# Patient Record
Sex: FEMALE | Race: WHITE | NOT HISPANIC OR LATINO | Employment: FULL TIME | URBAN - METROPOLITAN AREA
[De-identification: names, ages, dates, MRNs, and addresses within clinical notes are randomized per-mention and may not be internally consistent; named-entity substitution may affect disease eponyms.]

---

## 2017-01-03 ENCOUNTER — OFFICE VISIT (OUTPATIENT)
Dept: FAMILY MEDICINE CLINIC | Facility: CLINIC | Age: 42
End: 2017-01-03

## 2017-01-03 VITALS
OXYGEN SATURATION: 94 % | SYSTOLIC BLOOD PRESSURE: 116 MMHG | HEART RATE: 94 BPM | HEIGHT: 68 IN | BODY MASS INDEX: 33.04 KG/M2 | DIASTOLIC BLOOD PRESSURE: 76 MMHG | WEIGHT: 218 LBS

## 2017-01-03 DIAGNOSIS — F32.A DEPRESSION, UNSPECIFIED DEPRESSION TYPE: Primary | ICD-10-CM

## 2017-01-03 PROCEDURE — 99213 OFFICE O/P EST LOW 20 MIN: CPT | Performed by: NURSE PRACTITIONER

## 2017-01-03 RX ORDER — MULTIVIT WITH MINERALS/LUTEIN
250 TABLET ORAL DAILY
COMMUNITY

## 2017-01-03 RX ORDER — FLUOXETINE HYDROCHLORIDE 20 MG/1
20 CAPSULE ORAL DAILY
Qty: 30 CAPSULE | Refills: 0 | Status: SHIPPED | OUTPATIENT
Start: 2017-01-03 | End: 2017-02-06

## 2017-01-03 NOTE — MR AVS SNAPSHOT
Merari GLORIA Bowen   1/3/2017 11:30 AM   Office Visit    Provider:  RANDOLPH Lofton   Department:  [facility]   Dept Phone:  [phone]                Your Full Care Plan              Today's Medication Changes          These changes are accurate as of: 1/3/17  1:00 PM.  If you have any questions, ask your nurse or doctor.               Medication(s)that have changed:     FLUoxetine 20 MG capsule   Commonly known as:  PROzac   Take 1 capsule by mouth Daily.   What changed:    - medication strength  - how much to take   Changed by:  RANDOLPH Lofton         Stop taking medication(s)listed here:     azithromycin 250 MG tablet   Commonly known as:  ZITHROMAX   Stopped by:  RANDOLPH Lofton                Where to Get Your Medications      These medications were sent to [id number] - [facility] [address] [phone]  - [phone]   [address] [address]     Phone:  [phone]     FLUoxetine 20 MG capsule                  Your Updated Medication List          This list is accurate as of: 1/3/17  1:00 PM.  Always use your most recent med list.                cetirizine 10 MG tablet   Commonly known as:  zyrTEC       FLUoxetine 20 MG capsule   Commonly known as:  PROzac   Take 1 capsule by mouth Daily.       fluticasone 50 MCG/ACT nasal spray   Commonly known as:  FLONASE       omeprazole 40 MG capsule   Commonly known as:  priLOSEC   TAKE 1 CAPSULE BY MOUTH DAILY       PROAIR RESPICLICK IN       SUMAtriptan 100 MG tablet   Commonly known as:  IMITREX   Take one tablet at onset of headache. May repeat dose one time in 2 hours if headache not relieved.       vitamin C 250 MG tablet   Commonly known as:  ASCORBIC ACID               You Were Diagnosed With        Codes Comments    Depression, unspecified depression type    -  Primary ICD-10-CM: F32.9  ICD-9-CM: 311       Instructions     "None    Patient Instructions History      MyChart Signup     Our records indicate that you have declined [facility] signup. If you would like to sign up for [employer], please email [facility] [email or web address] [email or web address] or call [phone] to obtain an activation code.             Other Info from Your Visit           Allergies     Chlorcyclizine-pseudoephed      Cimetidine      Diclofenac        Reason for Visit     Depression           Vital Signs     Blood Pressure Pulse Height Weight Oxygen Saturation Body Mass Index    116/76 94 68\" (172.7 cm) 218 lb (98.9 kg) 94% 33.15 kg/m2    Smoking Status                   Current Some Day Smoker           Problems and Diagnoses Noted     Depression      "

## 2017-01-03 NOTE — PROGRESS NOTES
Subjective   Merari Bowen is a 42 y.o. female. Here today for follow up on her depression. She feels it is worsening. She wants to sleep all the time and cries easily.  She has been on depression medication for several years and at one time was on 40 mg. She has been taking 10 mg for at least the last 18 months. The depression started getting worse over the last few months and really worse on the holidays. She was originally put on Prozac by Dr. Jaramillo and states that it has worked pretty well and she is upset that he has passed away. She is unhappy about her job feeling overwhelmed with the amount of work required. She does not have the option of changing jobs. She has had some thoughts of suicide but has not made any plans nor does she feel she will that's why she came in today. Her  is accompanying her today and is supportive. She has not had formal counseling and does not really want to do that at this time.     Depression   Visit Type: follow-up  Patient presents with the following symptoms: depressed mood, hypersomnia, nervousness/anxiety and suicidal ideas (no plans but fleeting thoughts).  Patient is not experiencing: excessive worry and feelings of hopelessness.  Frequency of symptoms: constantly   Severity: interfering with daily activities   Sleep per night: 13 hours  Sleep quality: poor  Nighttime awakenings: several         The following portions of the patient's history were reviewed and updated as appropriate: allergies, current medications, past medical history, past social history and problem list.    Review of Systems   Constitutional: Negative.    Respiratory: Negative.    Cardiovascular: Negative.    Psychiatric/Behavioral: Positive for dysphoric mood and suicidal ideas (no plans but fleeting thoughts). The patient is nervous/anxious.        Objective   Physical Exam   Constitutional: She appears well-developed and well-nourished.   Cardiovascular: Normal rate, regular rhythm and  normal heart sounds.    Pulmonary/Chest: Effort normal and breath sounds normal.   Nursing note and vitals reviewed.    Vitals:    01/03/17 1128   BP: 116/76   Pulse: 94   SpO2: 94%       Assessment/Plan   Merari was seen today for depression.    Diagnoses and all orders for this visit:    Depression, unspecified depression type  -     FLUoxetine (PROzac) 20 MG capsule; Take 1 capsule by mouth Daily.  Will increase the Prozac to 20 mg daily. She is to follow up by phone in about one week to see if this is helping at all. If not, will double to 40 mg. She is to follow up in 3 weeks for recheck. Consider changing medication at that time if not effective. She was encouraged to try formal counseling but she is reluctant to do so. Will provide numbers. Her  was advised to take her to mental health facility if he feels she is suicidal and would act. He understands and agrees.

## 2017-02-06 ENCOUNTER — OFFICE VISIT (OUTPATIENT)
Dept: FAMILY MEDICINE CLINIC | Facility: CLINIC | Age: 42
End: 2017-02-06

## 2017-02-06 VITALS
BODY MASS INDEX: 33.15 KG/M2 | OXYGEN SATURATION: 98 % | HEART RATE: 88 BPM | WEIGHT: 218 LBS | TEMPERATURE: 98.5 F | SYSTOLIC BLOOD PRESSURE: 140 MMHG | DIASTOLIC BLOOD PRESSURE: 86 MMHG

## 2017-02-06 DIAGNOSIS — H66.002 ACUTE SUPPURATIVE OTITIS MEDIA OF LEFT EAR WITHOUT SPONTANEOUS RUPTURE OF TYMPANIC MEMBRANE, RECURRENCE NOT SPECIFIED: ICD-10-CM

## 2017-02-06 DIAGNOSIS — E78.5 DYSLIPIDEMIA: ICD-10-CM

## 2017-02-06 DIAGNOSIS — F32.4 MAJOR DEPRESSIVE DISORDER WITH SINGLE EPISODE, IN PARTIAL REMISSION (HCC): Primary | ICD-10-CM

## 2017-02-06 DIAGNOSIS — R11.0 NAUSEA: ICD-10-CM

## 2017-02-06 LAB
ALBUMIN SERPL-MCNC: 4.1 G/DL (ref 3.5–5.2)
ALBUMIN/GLOB SERPL: 1.5 G/DL
ALP SERPL-CCNC: 69 U/L (ref 39–117)
ALT SERPL-CCNC: 25 U/L (ref 1–33)
AST SERPL-CCNC: 22 U/L (ref 1–32)
BILIRUB SERPL-MCNC: 0.4 MG/DL (ref 0.1–1.2)
BUN SERPL-MCNC: 11 MG/DL (ref 6–20)
BUN/CREAT SERPL: 14.1 (ref 7–25)
CALCIUM SERPL-MCNC: 9.2 MG/DL (ref 8.6–10.5)
CHLORIDE SERPL-SCNC: 104 MMOL/L (ref 98–107)
CHOLEST SERPL-MCNC: 259 MG/DL (ref 0–200)
CO2 SERPL-SCNC: 26.9 MMOL/L (ref 22–29)
CREAT SERPL-MCNC: 0.78 MG/DL (ref 0.57–1)
GLOBULIN SER CALC-MCNC: 2.8 GM/DL
GLUCOSE SERPL-MCNC: 96 MG/DL (ref 65–99)
HDLC SERPL-MCNC: 42 MG/DL (ref 40–60)
LDLC SERPL CALC-MCNC: 196 MG/DL (ref 0–100)
POTASSIUM SERPL-SCNC: 3.7 MMOL/L (ref 3.5–5.2)
PROT SERPL-MCNC: 6.9 G/DL (ref 6–8.5)
SODIUM SERPL-SCNC: 142 MMOL/L (ref 136–145)
TRIGL SERPL-MCNC: 107 MG/DL (ref 0–150)
VLDLC SERPL CALC-MCNC: 21.4 MG/DL (ref 5–40)

## 2017-02-06 PROCEDURE — 99213 OFFICE O/P EST LOW 20 MIN: CPT | Performed by: FAMILY MEDICINE

## 2017-02-06 RX ORDER — FLUOXETINE HYDROCHLORIDE 10 MG/1
10 CAPSULE ORAL NIGHTLY
Qty: 30 EACH | Refills: 5 | Status: SHIPPED | OUTPATIENT
Start: 2017-02-06 | End: 2017-03-20 | Stop reason: ALTCHOICE

## 2017-02-06 RX ORDER — VILAZODONE HYDROCHLORIDE 20 MG/1
20 TABLET ORAL DAILY
Qty: 30 TABLET | Refills: 5 | Status: SHIPPED | OUTPATIENT
Start: 2017-02-06 | End: 2017-03-20 | Stop reason: SDUPTHER

## 2017-02-06 RX ORDER — AMOXICILLIN 500 MG/1
500 CAPSULE ORAL 3 TIMES DAILY
Qty: 30 CAPSULE | Refills: 0 | Status: SHIPPED | OUTPATIENT
Start: 2017-02-06 | End: 2017-03-20

## 2017-02-06 RX ORDER — ONDANSETRON 4 MG/1
4 TABLET, FILM COATED ORAL EVERY 8 HOURS PRN
Qty: 24 TABLET | Refills: 0 | Status: SHIPPED | OUTPATIENT
Start: 2017-02-06 | End: 2017-03-20

## 2017-02-06 NOTE — PROGRESS NOTES
Subjective   Merari Bowen is a 42 y.o. female. Presents today for   Chief Complaint   Patient presents with   • Follow-up     med change of prozac not helping.  Having a hard time waking up on this and tired is there something she can split bid ?  Feeling nauseated and headache this weekend with 1 x diarrhea.       Depression   Visit Type: follow-up  Patient presents with the following symptoms: depressed mood, excessive worry, hypersomnia, nervousness/anxiety and panic.    Migraine    This is a chronic problem. The problem occurs intermittently. The problem has been waxing and waning. Associated symptoms include abdominal pain (epigastric) and nausea. Pertinent negatives include no vomiting. Treatments tried: imitrex. The treatment provided moderate (HA resolved, but nausea has not.) relief. Her past medical history is significant for migraine headaches.   Nausea   This is a new problem. The current episode started in the past 7 days. The problem occurs constantly. The problem has been unchanged. Associated symptoms include abdominal pain (epigastric) and nausea. Pertinent negatives include no vomiting. Associated symptoms comments: Diarrhea x1, but resolved. Nothing aggravates the symptoms. She has tried nothing (Tx migraine.) for the symptoms. The treatment provided no relief.   Hx of dyslipidemia, off statin as intolerant, due for recheck  Reports nasal congestion and earache, feels full/dizzy.    Review of Systems   Gastrointestinal: Positive for abdominal pain (epigastric) and nausea. Negative for vomiting.   Psychiatric/Behavioral: The patient is nervous/anxious.        The following portions of the patient's history were reviewed and updated as appropriate: allergies, current medications, past medical history and problem list.    Patient Active Problem List   Diagnosis   • Atopic dermatitis   • Calf pain   • Calf swelling   • Carpal tunnel syndrome   • Essential familial hypercholesterolemia   • Fatigue    • Leg cramp   • Cervical pain   • Lesion of ulnar nerve   • Vitamin D deficiency   • Depression       Allergies   Allergen Reactions   • Chlorcyclizine-Pseudoephed    • Cimetidine    • Diclofenac        Current Outpatient Prescriptions on File Prior to Visit   Medication Sig Dispense Refill   • Albuterol Sulfate (PROAIR RESPICLICK IN) Inhale every 6 (six) hours as needed.     • cetirizine (ZyrTEC) 10 MG tablet Take 10 mg by mouth Daily.     • FLUoxetine (PROzac) 20 MG capsule Take 1 capsule by mouth Daily. 30 capsule 0   • omeprazole (priLOSEC) 40 MG capsule TAKE 1 CAPSULE BY MOUTH DAILY 90 capsule 1   • SUMAtriptan (IMITREX) 100 MG tablet Take one tablet at onset of headache. May repeat dose one time in 2 hours if headache not relieved. 9 tablet 5   • vitamin C (ASCORBIC ACID) 250 MG tablet Take 250 mg by mouth Daily.     • [DISCONTINUED] fluticasone (FLONASE) 50 MCG/ACT nasal spray 2 SPRAYS EACH NOSTRIL DAILY  4     No current facility-administered medications on file prior to visit.        Objective   Vitals:    02/06/17 0905   BP: 140/86   Pulse: 88   Temp: 98.5 °F (36.9 °C)   SpO2: 98%   Weight: 218 lb (98.9 kg)       Physical Exam   Constitutional: She appears well-developed and well-nourished.   HENT:   Head: Normocephalic and atraumatic.   Right Ear: Hearing, tympanic membrane, external ear and ear canal normal.   Left Ear: Hearing, external ear and ear canal normal. Tympanic membrane is injected and erythematous. A middle ear effusion is present.   Neck: Neck supple. No JVD present. No thyromegaly present.   Cardiovascular: Normal rate, regular rhythm and normal heart sounds.  Exam reveals no gallop and no friction rub.    No murmur heard.  Pulmonary/Chest: Effort normal and breath sounds normal. No respiratory distress. She has no wheezes. She has no rales.   Abdominal: Soft. Bowel sounds are normal. She exhibits no distension. There is no tenderness. There is no rebound and no guarding.    Musculoskeletal: She exhibits no edema.   Neurological: She is alert.   Skin: Skin is warm and dry.   Psychiatric: She has a normal mood and affect. Her behavior is normal.   Nursing note and vitals reviewed.      Assessment/Plan   Merari was seen today for follow-up.    Diagnoses and all orders for this visit:    Major depressive disorder with single episode, in partial remission  -     FLUoxetine HCl, PMDD, 10 MG capsule; Take 10 mg by mouth Every Night.  -     vilazodone (VIIBRYD) 20 MG tablet tablet; Take 1 tablet by mouth Daily.    Dyslipidemia  -     Lipid Panel  -     Comprehensive Metabolic Panel    Acute suppurative otitis media of left ear without spontaneous rupture of tympanic membrane, recurrence not specified  -     amoxicillin (AMOXIL) 500 MG capsule; Take 1 capsule by mouth 3 (Three) Times a Day.    Nausea  -     ondansetron (ZOFRAN) 4 MG tablet; Take 1 tablet by mouth Every 8 (Eight) Hours As Needed for nausea or vomiting.    -will cut prozac to 10mg daily for 4 weeks and gave sample packs of viibyd to take 10mg daily x 14 days, then 20mg x 14 days;  After 4 weeks stop prozac, call if doesn't tolerate stopping.  Will then f/u with in 2 months to see how doing.         -Follow up: 2 months       Current Outpatient Prescriptions:   •  Albuterol Sulfate (PROAIR RESPICLICK IN), Inhale every 6 (six) hours as needed., Disp: , Rfl:   •  cetirizine (ZyrTEC) 10 MG tablet, Take 10 mg by mouth Daily., Disp: , Rfl:   •  FLUoxetine (PROzac) 20 MG capsule, Take 1 capsule by mouth Daily., Disp: 30 capsule, Rfl: 0  •  omeprazole (priLOSEC) 40 MG capsule, TAKE 1 CAPSULE BY MOUTH DAILY, Disp: 90 capsule, Rfl: 1  •  SUMAtriptan (IMITREX) 100 MG tablet, Take one tablet at onset of headache. May repeat dose one time in 2 hours if headache not relieved., Disp: 9 tablet, Rfl: 5  •  vitamin C (ASCORBIC ACID) 250 MG tablet, Take 250 mg by mouth Daily., Disp: , Rfl:

## 2017-02-08 NOTE — PROGRESS NOTES
Please call the patient regarding her abnormal result.  Patient's kidney, liver function and blood sugar are normal.  Her cholesterol is very high with LDL of 196.  While I do recommend low cholesterol diet and exercise always to make sure not worse, when above 175 likely genetic cause and unlikely to improve without medications.  If can tolerate, I would suggest atorvastatin again 40mg po daily (likely needs 80mg).

## 2017-02-13 ENCOUNTER — TELEPHONE (OUTPATIENT)
Dept: FAMILY MEDICINE CLINIC | Facility: CLINIC | Age: 42
End: 2017-02-13

## 2017-02-13 RX ORDER — ATORVASTATIN CALCIUM 40 MG/1
40 TABLET, FILM COATED ORAL DAILY
Qty: 90 TABLET | Refills: 1 | Status: SHIPPED | OUTPATIENT
Start: 2017-02-13 | End: 2017-03-20

## 2017-02-13 NOTE — TELEPHONE ENCOUNTER
----- Message from Chandan Rubio DO sent at 2/8/2017  7:54 AM EST -----  Please call the patient regarding her abnormal result.  Patient's kidney, liver function and blood sugar are normal.  Her cholesterol is very high with LDL of 196.  While I do recommend low cholesterol diet and exercise always to make sure not worse, when above 175 likely genetic cause and unlikely to improve without medications.  If can tolerate, I would suggest atorvastatin again 40mg po daily (likely needs 80mg).

## 2017-03-06 RX ORDER — FLUOXETINE 10 MG/1
CAPSULE ORAL
Qty: 90 CAPSULE | Refills: 3 | Status: SHIPPED | OUTPATIENT
Start: 2017-03-06 | End: 2017-03-20 | Stop reason: ALTCHOICE

## 2017-03-20 ENCOUNTER — OFFICE VISIT (OUTPATIENT)
Dept: FAMILY MEDICINE CLINIC | Facility: CLINIC | Age: 42
End: 2017-03-20

## 2017-03-20 VITALS
DIASTOLIC BLOOD PRESSURE: 80 MMHG | WEIGHT: 221 LBS | BODY MASS INDEX: 33.49 KG/M2 | SYSTOLIC BLOOD PRESSURE: 110 MMHG | HEIGHT: 68 IN | TEMPERATURE: 98.2 F | HEART RATE: 72 BPM | OXYGEN SATURATION: 97 %

## 2017-03-20 DIAGNOSIS — R39.9 UTI SYMPTOMS: Primary | ICD-10-CM

## 2017-03-20 DIAGNOSIS — E78.49 OTHER HYPERLIPIDEMIA: ICD-10-CM

## 2017-03-20 DIAGNOSIS — N39.0 URINARY TRACT INFECTION, SITE UNSPECIFIED: ICD-10-CM

## 2017-03-20 DIAGNOSIS — F32.4 MAJOR DEPRESSIVE DISORDER WITH SINGLE EPISODE, IN PARTIAL REMISSION (HCC): ICD-10-CM

## 2017-03-20 LAB
BILIRUB BLD-MCNC: NEGATIVE MG/DL
CLARITY, POC: CLEAR
COLOR UR: YELLOW
GLUCOSE UR STRIP-MCNC: NEGATIVE MG/DL
KETONES UR QL: NEGATIVE
LEUKOCYTE EST, POC: ABNORMAL
NITRITE UR-MCNC: NEGATIVE MG/ML
PH UR: 6 [PH] (ref 5–8)
PROT UR STRIP-MCNC: NEGATIVE MG/DL
RBC # UR STRIP: NEGATIVE /UL
SP GR UR: 1.01 (ref 1–1.03)
UROBILINOGEN UR QL: NORMAL

## 2017-03-20 PROCEDURE — 99213 OFFICE O/P EST LOW 20 MIN: CPT | Performed by: FAMILY MEDICINE

## 2017-03-20 PROCEDURE — 81003 URINALYSIS AUTO W/O SCOPE: CPT | Performed by: FAMILY MEDICINE

## 2017-03-20 RX ORDER — CEPHALEXIN 500 MG/1
500 CAPSULE ORAL 2 TIMES DAILY
Qty: 14 CAPSULE | Refills: 0 | Status: SHIPPED | OUTPATIENT
Start: 2017-03-20 | End: 2017-03-23 | Stop reason: ALTCHOICE

## 2017-03-20 RX ORDER — VILAZODONE HYDROCHLORIDE 20 MG/1
20 TABLET ORAL DAILY
Qty: 30 TABLET | Refills: 5 | Status: SHIPPED | OUTPATIENT
Start: 2017-03-20 | End: 2017-06-20 | Stop reason: SINTOL

## 2017-03-20 RX ORDER — PRAVASTATIN SODIUM 10 MG
10 TABLET ORAL DAILY
Qty: 30 TABLET | Refills: 5 | Status: SHIPPED | OUTPATIENT
Start: 2017-03-20

## 2017-03-20 RX ORDER — PRAVASTATIN SODIUM 10 MG
10 TABLET ORAL DAILY
Qty: 30 TABLET | Refills: 5 | Status: SHIPPED | OUTPATIENT
Start: 2017-03-20 | End: 2017-03-20 | Stop reason: SDUPTHER

## 2017-03-20 NOTE — PROGRESS NOTES
Subjective   Merari Bowen is a 42 y.o. female. Presents today for   Chief Complaint   Patient presents with   • Depression     pt here for follow up visit for medication change   • Back Pain     pt has been having pains in her back, and urinary urgency.       Depression   Visit Type: follow-up (off fluoxetine and now on viibryd.  Reports working, mood better.  Thinks better;  Only change is more tired.  Off fluoxetine 1.5 weeks.)  Patient presents with the following symptoms: depressed mood, excessive worry, insomnia and nervousness/anxiety.  Frequency of symptoms: occasionally   Severity: mild   Sleep quality: fair  Compliance with medications:  %      Back Pain   This is a recurrent problem. The current episode started 1 to 4 weeks ago. The problem occurs intermittently. The problem has been waxing and waning since onset. The pain is present in the lumbar spine. The quality of the pain is described as burning and aching. Associated symptoms include dysuria. Pertinent negatives include no abdominal pain or fever. (+urgency, small amounts, frequently) Treatments tried: fluids. The treatment provided no relief.     Reports aching severely with atorvastatin, didn't tolerate.    Review of Systems   Constitutional: Negative for fever.   Gastrointestinal: Negative for abdominal pain.   Genitourinary: Positive for dysuria.   Musculoskeletal: Positive for back pain.   Psychiatric/Behavioral: The patient is nervous/anxious and has insomnia.        The following portions of the patient's history were reviewed and updated as appropriate: allergies, current medications, past medical history and problem list.    Patient Active Problem List   Diagnosis   • Atopic dermatitis   • Carpal tunnel syndrome   • Dyslipidemia   • Fatigue   • Cervical pain   • Vitamin D deficiency   • Depression       Allergies   Allergen Reactions   • Chlorcyclizine-Pseudoephed    • Cimetidine    • Diclofenac        Current Outpatient  "Prescriptions on File Prior to Visit   Medication Sig Dispense Refill   • Albuterol Sulfate (PROAIR RESPICLICK IN) Inhale every 6 (six) hours as needed.     • atorvastatin (LIPITOR) 40 MG tablet Take 1 tablet by mouth Daily. 90 tablet 1   • cetirizine (ZyrTEC) 10 MG tablet Take 10 mg by mouth Daily.     • omeprazole (priLOSEC) 40 MG capsule TAKE 1 CAPSULE BY MOUTH DAILY 90 capsule 1   • SUMAtriptan (IMITREX) 100 MG tablet Take one tablet at onset of headache. May repeat dose one time in 2 hours if headache not relieved. 9 tablet 5   • vilazodone (VIIBRYD) 20 MG tablet tablet Take 1 tablet by mouth Daily. 30 tablet 5   • vitamin C (ASCORBIC ACID) 250 MG tablet Take 250 mg by mouth Daily.     • [DISCONTINUED] amoxicillin (AMOXIL) 500 MG capsule Take 1 capsule by mouth 3 (Three) Times a Day. 30 capsule 0   • [DISCONTINUED] FLUoxetine (PROzac) 10 MG capsule TAKE 1 CAPSULE BY MOUTH DAILY. 90 capsule 3   • [DISCONTINUED] FLUoxetine HCl, PMDD, 10 MG capsule Take 10 mg by mouth Every Night. 30 each 5   • [DISCONTINUED] ondansetron (ZOFRAN) 4 MG tablet Take 1 tablet by mouth Every 8 (Eight) Hours As Needed for nausea or vomiting. 24 tablet 0     No current facility-administered medications on file prior to visit.        Objective   Vitals:    03/20/17 1022   BP: 110/80   BP Location: Left arm   Patient Position: Sitting   Cuff Size: Large Adult   Pulse: 72   Temp: 98.2 °F (36.8 °C)   TempSrc: Oral   SpO2: 97%   Weight: 221 lb (100 kg)   Height: 68\" (172.7 cm)       Physical Exam   Constitutional: She appears well-developed and well-nourished.   HENT:   Head: Normocephalic and atraumatic.   Neck: Neck supple. No JVD present. No thyromegaly present.   Cardiovascular: Normal rate, regular rhythm and normal heart sounds.  Exam reveals no gallop and no friction rub.    No murmur heard.  Pulmonary/Chest: Effort normal and breath sounds normal. No respiratory distress. She has no wheezes. She has no rales.   Abdominal: Soft. Bowel " sounds are normal. She exhibits no distension. There is no tenderness. There is no rebound and no guarding.   Musculoskeletal: She exhibits no edema.   Neurological: She is alert.   Skin: Skin is warm and dry.   Psychiatric: She has a normal mood and affect. Her behavior is normal.   Nursing note and vitals reviewed.    POCT urinalysis dipstick, automated   Order: [id number]   Status:  Final result   Visible to patient:  No (Not Released) Dx:  UTI symptoms      Ref Range & Units 10:35 AM     Color Yellow, Straw, Dark Yellow, Gilma Yellow   Clarity, UA Clear Clear   Glucose, UA Negative, 1000 mg/dL (3+) mg/dL Negative   Bilirubin Negative Negative   Ketones, UA Negative Negative   Specific Gravity  1.005 - 1.030 1.015   Blood, UA Negative Negative   pH, Urine 5.0 - 8.0 6.0   Protein, POC Negative mg/dL Negative   Urobilinogen, UA Normal Normal   Leukocytes Negative Trace (A)   Nitrite, UA Negative Negative   Resulting Agency  [facility]      Specimen Collected: 03/20/17 10:35 AM Last Resulted: 03/20/17 10:35 AM                  Assessment/Plan   Merari was seen today for depression and back pain.    Diagnoses and all orders for this visit:    UTI symptoms  -     POCT urinalysis dipstick, automated  -     cephalexin (KEFLEX) 500 MG capsule; Take 1 capsule by mouth 2 (Two) Times a Day.    Urinary tract infection, site unspecified  -     Urine culture (clean catch)    Other hyperlipidemia  -     Discontinue: pravastatin (PRAVACHOL) 10 MG tablet; Take 1 tablet by mouth Daily.  -     pravastatin (PRAVACHOL) 10 MG tablet; Take 1 tablet by mouth Daily.    Major depressive disorder with single episode, in partial remission  -     vilazodone (VIIBRYD) 20 MG tablet tablet; Take 1 tablet by mouth Daily.    -doing better on viibryd, will give more time to see if sleep better.  Gave samples and has Rx card for viibryd  -stop atorvastatin, will try low dose pravastatin, will dose increase as toelrates.  Last  , will likely need more;  If tolerates pravastatin will need 90 days [facility]  -uti symptoms, send cx/s and start abx while waiting       -Follow up: 3 months       Current Outpatient Prescriptions:   •  Albuterol Sulfate (PROAIR RESPICLICK IN), Inhale every 6 (six) hours as needed., Disp: , Rfl:   •  atorvastatin (LIPITOR) 40 MG tablet, Take 1 tablet by mouth Daily., Disp: 90 tablet, Rfl: 1  •  cetirizine (ZyrTEC) 10 MG tablet, Take 10 mg by mouth Daily., Disp: , Rfl:   •  omeprazole (priLOSEC) 40 MG capsule, TAKE 1 CAPSULE BY MOUTH DAILY, Disp: 90 capsule, Rfl: 1  •  SUMAtriptan (IMITREX) 100 MG tablet, Take one tablet at onset of headache. May repeat dose one time in 2 hours if headache not relieved., Disp: 9 tablet, Rfl: 5  •  vilazodone (VIIBRYD) 20 MG tablet tablet, Take 1 tablet by mouth Daily., Disp: 30 tablet, Rfl: 5  •  vitamin C (ASCORBIC ACID) 250 MG tablet, Take 250 mg by mouth Daily., Disp: , Rfl:

## 2017-03-22 LAB
BACTERIA UR CULT: ABNORMAL
BACTERIA UR CULT: ABNORMAL
OTHER ANTIBIOTIC SUSC ISLT: ABNORMAL

## 2017-03-22 NOTE — PROGRESS NOTES
Please call the patient regarding her abnormal result.  Urine culture + and it is resistant to the antibiotic given, unfortunately.  Stop keflex and start nitrofurantoin 100mg 1 po bid x 7 days.

## 2017-03-23 DIAGNOSIS — R39.9 UTI SYMPTOMS: ICD-10-CM

## 2017-03-23 RX ORDER — NITROFURANTOIN 25; 75 MG/1; MG/1
100 CAPSULE ORAL 2 TIMES DAILY
Qty: 14 CAPSULE | Refills: 0 | Status: SHIPPED | OUTPATIENT
Start: 2017-03-23 | End: 2017-03-30

## 2017-04-07 ENCOUNTER — TELEPHONE (OUTPATIENT)
Dept: FAMILY MEDICINE CLINIC | Facility: CLINIC | Age: 42
End: 2017-04-07

## 2017-04-10 RX ORDER — TRAZODONE HYDROCHLORIDE 50 MG/1
50 TABLET ORAL NIGHTLY
Qty: 30 TABLET | Refills: 1 | Status: SHIPPED | OUTPATIENT
Start: 2017-04-10 | End: 2017-05-11 | Stop reason: SDUPTHER

## 2017-04-11 ENCOUNTER — TELEPHONE (OUTPATIENT)
Dept: FAMILY MEDICINE CLINIC | Facility: CLINIC | Age: 42
End: 2017-04-11

## 2017-05-11 RX ORDER — TRAZODONE HYDROCHLORIDE 50 MG/1
TABLET ORAL
Qty: 30 TABLET | Refills: 6 | Status: SHIPPED | OUTPATIENT
Start: 2017-05-11 | End: 2017-05-12 | Stop reason: SDUPTHER

## 2017-05-12 RX ORDER — TRAZODONE HYDROCHLORIDE 50 MG/1
50 TABLET ORAL NIGHTLY
Qty: 90 TABLET | Refills: 1 | Status: SHIPPED | OUTPATIENT
Start: 2017-05-12

## 2017-06-12 RX ORDER — OMEPRAZOLE 40 MG/1
CAPSULE, DELAYED RELEASE ORAL
Qty: 90 CAPSULE | Refills: 1 | Status: SHIPPED | OUTPATIENT
Start: 2017-06-12

## 2017-06-20 ENCOUNTER — TELEPHONE (OUTPATIENT)
Dept: FAMILY MEDICINE CLINIC | Facility: CLINIC | Age: 42
End: 2017-06-20

## 2017-06-20 RX ORDER — BUPROPION HYDROCHLORIDE 150 MG/1
150 TABLET ORAL EVERY MORNING
Qty: 30 TABLET | Refills: 5 | Status: SHIPPED | OUTPATIENT
Start: 2017-06-20 | End: 2017-08-01 | Stop reason: DRUGHIGH

## 2017-06-20 NOTE — TELEPHONE ENCOUNTER
Pt states stomach hurts and causing severe diarrhea and she only takes twice weekly.  Dr spence said stop viibryd and start wellbutrin xl 150 mg daily and pt informed, rx sent to [facility] per pt request jm

## 2017-06-21 ENCOUNTER — TELEPHONE (OUTPATIENT)
Dept: FAMILY MEDICINE CLINIC | Facility: CLINIC | Age: 42
End: 2017-06-21

## 2017-06-21 NOTE — TELEPHONE ENCOUNTER
MOst of the antidepressants say this on label.  Most of the drugs when studied require all symptoms reported during trials be listed on side effect profile regardless of whether same level was reported in placebo group.  Since it's used for mental health reasons, anxiety would be reported fairly frequently.  It isn't my 1st choice for anxiety, but she had significant diarrhea with one of the newer serotonin agonist/SSRI drugs.  We can try one of the SSRIs like prozac she was on, the wellbutrin tends to be weight neutral or weight loss.  She may honestly need to take a 2nd agent later, if tolerates the wellbutrin.  However, it's her body and I am willing to try other things if she is uncomfortable with this.    RRJ

## 2017-06-21 NOTE — TELEPHONE ENCOUNTER
Tried to call pt, she is cutting out really bad and I can't hear anything she is saying, I will try her again later

## 2017-06-21 NOTE — TELEPHONE ENCOUNTER
Pt's anxiety has been really bad. She was reading the side effects of Wellbutrin and it says it causes anxiety. And it only says it treats depression and not anxiety.

## 2017-07-31 ENCOUNTER — TELEPHONE (OUTPATIENT)
Dept: FAMILY MEDICINE CLINIC | Facility: CLINIC | Age: 42
End: 2017-07-31

## 2017-08-01 RX ORDER — BUPROPION HYDROCHLORIDE 300 MG/1
300 TABLET ORAL DAILY
Qty: 90 TABLET | Refills: 1 | Status: SHIPPED | OUTPATIENT
Start: 2017-08-01 | End: 2018-03-13 | Stop reason: SDUPTHER

## 2017-08-01 NOTE — TELEPHONE ENCOUNTER
Ok to increase from wellbutrin xl 150mg 1 po daily to 300mg 1 po daily.  ANy thoughts of death, suicide or plans to cause self harm?  We are required to ask to make sure doing okay.  If no, but develops any of this let me know right away.

## 2018-01-10 RX ORDER — ATORVASTATIN CALCIUM 40 MG/1
TABLET, FILM COATED ORAL
Qty: 90 TABLET | Refills: 1 | OUTPATIENT
Start: 2018-01-10

## 2018-03-08 RX ORDER — ATORVASTATIN CALCIUM 40 MG/1
TABLET, FILM COATED ORAL
Qty: 30 TABLET | Refills: 0 | Status: SHIPPED | OUTPATIENT
Start: 2018-03-08

## 2018-03-14 RX ORDER — BUPROPION HYDROCHLORIDE 300 MG/1
300 TABLET ORAL DAILY
Qty: 30 TABLET | Refills: 0 | Status: SHIPPED | OUTPATIENT
Start: 2018-03-14 | End: 2018-05-08 | Stop reason: SDUPTHER

## 2018-04-06 RX ORDER — ATORVASTATIN CALCIUM 40 MG/1
TABLET, FILM COATED ORAL
Qty: 30 TABLET | Refills: 0 | OUTPATIENT
Start: 2018-04-06

## 2018-04-27 RX ORDER — ATORVASTATIN CALCIUM 40 MG/1
TABLET, FILM COATED ORAL
Qty: 30 TABLET | Refills: 0 | OUTPATIENT
Start: 2018-04-27

## 2018-05-08 RX ORDER — BUPROPION HYDROCHLORIDE 300 MG/1
300 TABLET ORAL DAILY
Qty: 7 TABLET | Refills: 0 | Status: SHIPPED | OUTPATIENT
Start: 2018-05-08

## 2018-06-04 RX ORDER — BUPROPION HYDROCHLORIDE 300 MG/1
300 TABLET ORAL DAILY
Qty: 15 TABLET | Refills: 0 | Status: SHIPPED | OUTPATIENT
Start: 2018-06-04 | End: 2019-01-09 | Stop reason: HOSPADM

## 2019-01-05 ENCOUNTER — APPOINTMENT (OUTPATIENT)
Dept: CARDIOLOGY | Facility: HOSPITAL | Age: 44
End: 2019-01-05
Attending: HOSPITALIST

## 2019-01-05 ENCOUNTER — APPOINTMENT (OUTPATIENT)
Dept: CT IMAGING | Facility: HOSPITAL | Age: 44
End: 2019-01-05

## 2019-01-05 ENCOUNTER — HOSPITAL ENCOUNTER (INPATIENT)
Facility: HOSPITAL | Age: 44
LOS: 4 days | Discharge: HOME | End: 2019-01-09
Attending: HOSPITALIST | Admitting: HOSPITALIST

## 2019-01-05 ENCOUNTER — APPOINTMENT (OUTPATIENT)
Dept: ULTRASOUND IMAGING | Facility: HOSPITAL | Age: 44
End: 2019-01-05

## 2019-01-05 PROBLEM — I26.99 PULMONARY EMBOLI (HCC): Status: ACTIVE | Noted: 2019-01-05

## 2019-01-05 PROBLEM — I82.4Z2 ACUTE DEEP VEIN THROMBOSIS (DVT) OF DISTAL VEIN OF LEFT LOWER EXTREMITY (HCC): Status: ACTIVE | Noted: 2019-01-05

## 2019-01-05 PROBLEM — R79.89 ELEVATED LIVER FUNCTION TESTS: Status: ACTIVE | Noted: 2019-01-05

## 2019-01-05 LAB
ALBUMIN SERPL-MCNC: 3 G/DL (ref 3.5–5.2)
ALBUMIN SERPL-MCNC: 3 G/DL (ref 3.5–5.2)
ALBUMIN/GLOB SERPL: 0.8 G/DL
ALBUMIN/GLOB SERPL: 0.8 G/DL
ALP SERPL-CCNC: 267 U/L (ref 39–117)
ALP SERPL-CCNC: 439 U/L (ref 39–117)
ALT SERPL W P-5'-P-CCNC: 332 U/L (ref 1–33)
ALT SERPL W P-5'-P-CCNC: 785 U/L (ref 1–33)
ANION GAP SERPL CALCULATED.3IONS-SCNC: 10.4 MMOL/L
ANION GAP SERPL CALCULATED.3IONS-SCNC: 11.9 MMOL/L
APAP SERPL-MCNC: <5 MCG/ML (ref 10–30)
APTT PPP: 83.6 SECONDS (ref 22.7–35.4)
APTT PPP: 94.5 SECONDS (ref 22.7–35.4)
APTT PPP: 96.6 SECONDS (ref 22.7–35.4)
AST SERPL-CCNC: 1152 U/L (ref 1–32)
AST SERPL-CCNC: 656 U/L (ref 1–32)
BASOPHILS # BLD AUTO: 0.03 10*3/MM3 (ref 0–0.2)
BASOPHILS NFR BLD AUTO: 0.2 % (ref 0–1.5)
BH CV LOW VAS LEFT GASTRONEMIUS VESSEL: 1
BH CV LOWER VASCULAR LEFT COMMON FEMORAL AUGMENT: NORMAL
BH CV LOWER VASCULAR LEFT COMMON FEMORAL COMPETENT: NORMAL
BH CV LOWER VASCULAR LEFT COMMON FEMORAL COMPRESS: NORMAL
BH CV LOWER VASCULAR LEFT COMMON FEMORAL PHASIC: NORMAL
BH CV LOWER VASCULAR LEFT COMMON FEMORAL SPONT: NORMAL
BH CV LOWER VASCULAR LEFT DISTAL FEMORAL COMPRESS: NORMAL
BH CV LOWER VASCULAR LEFT GASTRONEMIUS COMPRESS: NORMAL
BH CV LOWER VASCULAR LEFT GASTRONEMIUS THROMBUS: NORMAL
BH CV LOWER VASCULAR LEFT GREATER SAPH AK COMPRESS: NORMAL
BH CV LOWER VASCULAR LEFT GREATER SAPH BK COMPRESS: NORMAL
BH CV LOWER VASCULAR LEFT MID FEMORAL AUGMENT: NORMAL
BH CV LOWER VASCULAR LEFT MID FEMORAL COMPETENT: NORMAL
BH CV LOWER VASCULAR LEFT MID FEMORAL COMPRESS: NORMAL
BH CV LOWER VASCULAR LEFT MID FEMORAL PHASIC: NORMAL
BH CV LOWER VASCULAR LEFT MID FEMORAL SPONT: NORMAL
BH CV LOWER VASCULAR LEFT PERONEAL COMPRESS: NORMAL
BH CV LOWER VASCULAR LEFT POPLITEAL AUGMENT: NORMAL
BH CV LOWER VASCULAR LEFT POPLITEAL COMPETENT: NORMAL
BH CV LOWER VASCULAR LEFT POPLITEAL COMPRESS: NORMAL
BH CV LOWER VASCULAR LEFT POPLITEAL PHASIC: NORMAL
BH CV LOWER VASCULAR LEFT POPLITEAL SPONT: NORMAL
BH CV LOWER VASCULAR LEFT POSTERIOR TIBIAL COMPRESS: NORMAL
BH CV LOWER VASCULAR LEFT PROXIMAL FEMORAL COMPRESS: NORMAL
BH CV LOWER VASCULAR LEFT SAPHENOFEMORAL JUNCTION AUGMENT: NORMAL
BH CV LOWER VASCULAR LEFT SAPHENOFEMORAL JUNCTION COMPRESS: NORMAL
BH CV LOWER VASCULAR LEFT SAPHENOFEMORAL JUNCTION PHASIC: NORMAL
BH CV LOWER VASCULAR LEFT SAPHENOFEMORAL JUNCTION SPONT: NORMAL
BH CV LOWER VASCULAR RIGHT COMMON FEMORAL AUGMENT: NORMAL
BH CV LOWER VASCULAR RIGHT COMMON FEMORAL COMPETENT: NORMAL
BH CV LOWER VASCULAR RIGHT COMMON FEMORAL COMPRESS: NORMAL
BH CV LOWER VASCULAR RIGHT COMMON FEMORAL PHASIC: NORMAL
BH CV LOWER VASCULAR RIGHT COMMON FEMORAL SPONT: NORMAL
BH CV LOWER VASCULAR RIGHT DISTAL FEMORAL COMPRESS: NORMAL
BH CV LOWER VASCULAR RIGHT GASTRONEMIUS COMPRESS: NORMAL
BH CV LOWER VASCULAR RIGHT GREATER SAPH AK COMPRESS: NORMAL
BH CV LOWER VASCULAR RIGHT GREATER SAPH BK COMPRESS: NORMAL
BH CV LOWER VASCULAR RIGHT MID FEMORAL AUGMENT: NORMAL
BH CV LOWER VASCULAR RIGHT MID FEMORAL COMPETENT: NORMAL
BH CV LOWER VASCULAR RIGHT MID FEMORAL COMPRESS: NORMAL
BH CV LOWER VASCULAR RIGHT MID FEMORAL PHASIC: NORMAL
BH CV LOWER VASCULAR RIGHT MID FEMORAL SPONT: NORMAL
BH CV LOWER VASCULAR RIGHT PERONEAL COMPRESS: NORMAL
BH CV LOWER VASCULAR RIGHT POPLITEAL AUGMENT: NORMAL
BH CV LOWER VASCULAR RIGHT POPLITEAL COMPETENT: NORMAL
BH CV LOWER VASCULAR RIGHT POPLITEAL COMPRESS: NORMAL
BH CV LOWER VASCULAR RIGHT POPLITEAL PHASIC: NORMAL
BH CV LOWER VASCULAR RIGHT POPLITEAL SPONT: NORMAL
BH CV LOWER VASCULAR RIGHT POSTERIOR TIBIAL COMPRESS: NORMAL
BH CV LOWER VASCULAR RIGHT PROXIMAL FEMORAL COMPRESS: NORMAL
BH CV LOWER VASCULAR RIGHT SAPHENOFEMORAL JUNCTION AUGMENT: NORMAL
BH CV LOWER VASCULAR RIGHT SAPHENOFEMORAL JUNCTION COMPRESS: NORMAL
BH CV LOWER VASCULAR RIGHT SAPHENOFEMORAL JUNCTION PHASIC: NORMAL
BH CV LOWER VASCULAR RIGHT SAPHENOFEMORAL JUNCTION SPONT: NORMAL
BILIRUB SERPL-MCNC: 1 MG/DL (ref 0.1–1.2)
BILIRUB SERPL-MCNC: 1.1 MG/DL (ref 0.1–1.2)
BUN BLD-MCNC: 11 MG/DL (ref 6–20)
BUN BLD-MCNC: 13 MG/DL (ref 6–20)
BUN/CREAT SERPL: 17.2 (ref 7–25)
BUN/CREAT SERPL: 18.1 (ref 7–25)
CALCIUM SPEC-SCNC: 8.6 MG/DL (ref 8.6–10.5)
CALCIUM SPEC-SCNC: 8.8 MG/DL (ref 8.6–10.5)
CHLORIDE SERPL-SCNC: 100 MMOL/L (ref 98–107)
CHLORIDE SERPL-SCNC: 100 MMOL/L (ref 98–107)
CO2 SERPL-SCNC: 27.1 MMOL/L (ref 22–29)
CO2 SERPL-SCNC: 28.6 MMOL/L (ref 22–29)
CREAT BLD-MCNC: 0.64 MG/DL (ref 0.57–1)
CREAT BLD-MCNC: 0.72 MG/DL (ref 0.57–1)
DEPRECATED RDW RBC AUTO: 41.9 FL (ref 37–54)
EOSINOPHIL # BLD AUTO: 0.19 10*3/MM3 (ref 0–0.7)
EOSINOPHIL NFR BLD AUTO: 1.4 % (ref 0.3–6.2)
ERYTHROCYTE [DISTWIDTH] IN BLOOD BY AUTOMATED COUNT: 12.5 % (ref 11.7–13)
GFR SERPL CREATININE-BSD FRML MDRD: 100 ML/MIN/1.73
GFR SERPL CREATININE-BSD FRML MDRD: 87 ML/MIN/1.73
GLOBULIN UR ELPH-MCNC: 3.6 GM/DL
GLOBULIN UR ELPH-MCNC: 3.8 GM/DL
GLUCOSE BLD-MCNC: 103 MG/DL (ref 65–99)
GLUCOSE BLD-MCNC: 105 MG/DL (ref 65–99)
HAV IGM SERPL QL IA: NORMAL
HBV CORE IGM SERPL QL IA: NORMAL
HBV SURFACE AG SERPL QL IA: NORMAL
HCT VFR BLD AUTO: 31.1 % (ref 35.6–45.5)
HCV AB SER DONR QL: NORMAL
HGB BLD-MCNC: 10.6 G/DL (ref 11.9–15.5)
IMM GRANULOCYTES # BLD AUTO: 0.12 10*3/MM3 (ref 0–0.03)
IMM GRANULOCYTES NFR BLD AUTO: 0.9 % (ref 0–0.5)
INR PPP: 1.33 (ref 0.9–1.1)
LYMPHOCYTES # BLD AUTO: 1.11 10*3/MM3 (ref 0.9–4.8)
LYMPHOCYTES NFR BLD AUTO: 8.3 % (ref 19.6–45.3)
MCH RBC QN AUTO: 31.2 PG (ref 26.9–32)
MCHC RBC AUTO-ENTMCNC: 34.1 G/DL (ref 32.4–36.3)
MCV RBC AUTO: 91.5 FL (ref 80.5–98.2)
MONOCYTES # BLD AUTO: 1.29 10*3/MM3 (ref 0.2–1.2)
MONOCYTES NFR BLD AUTO: 9.7 % (ref 5–12)
NEUTROPHILS # BLD AUTO: 10.7 10*3/MM3 (ref 1.9–8.1)
NEUTROPHILS NFR BLD AUTO: 80.4 % (ref 42.7–76)
NT-PROBNP SERPL-MCNC: 33.5 PG/ML (ref 5–450)
PLATELET # BLD AUTO: 294 10*3/MM3 (ref 140–500)
PMV BLD AUTO: 10.4 FL (ref 6–12)
POTASSIUM BLD-SCNC: 3.6 MMOL/L (ref 3.5–5.2)
POTASSIUM BLD-SCNC: 3.9 MMOL/L (ref 3.5–5.2)
PROT SERPL-MCNC: 6.6 G/DL (ref 6–8.5)
PROT SERPL-MCNC: 6.8 G/DL (ref 6–8.5)
PROTHROMBIN TIME: 16.2 SECONDS (ref 11.7–14.2)
RBC # BLD AUTO: 3.4 10*6/MM3 (ref 3.9–5.2)
SODIUM BLD-SCNC: 139 MMOL/L (ref 136–145)
SODIUM BLD-SCNC: 139 MMOL/L (ref 136–145)
WBC NRBC COR # BLD: 13.32 10*3/MM3 (ref 4.5–10.7)

## 2019-01-05 PROCEDURE — 25010000002 HEPARIN (PORCINE) PER 1000 UNITS: Performed by: HOSPITALIST

## 2019-01-05 PROCEDURE — 85025 COMPLETE CBC W/AUTO DIFF WBC: CPT | Performed by: HOSPITALIST

## 2019-01-05 PROCEDURE — 80307 DRUG TEST PRSMV CHEM ANLYZR: CPT | Performed by: INTERNAL MEDICINE

## 2019-01-05 PROCEDURE — 93306 TTE W/DOPPLER COMPLETE: CPT

## 2019-01-05 PROCEDURE — 0 DIATRIZOATE MEGLUMINE & SODIUM PER 1 ML: Performed by: HOSPITALIST

## 2019-01-05 PROCEDURE — 93306 TTE W/DOPPLER COMPLETE: CPT | Performed by: INTERNAL MEDICINE

## 2019-01-05 PROCEDURE — 83880 ASSAY OF NATRIURETIC PEPTIDE: CPT | Performed by: INTERNAL MEDICINE

## 2019-01-05 PROCEDURE — 25010000002 IOPAMIDOL 61 % SOLUTION: Performed by: HOSPITALIST

## 2019-01-05 PROCEDURE — 93970 EXTREMITY STUDY: CPT

## 2019-01-05 PROCEDURE — 25010000002 HYDROMORPHONE PER 4 MG: Performed by: HOSPITALIST

## 2019-01-05 PROCEDURE — 80074 ACUTE HEPATITIS PANEL: CPT | Performed by: HOSPITALIST

## 2019-01-05 PROCEDURE — 85730 THROMBOPLASTIN TIME PARTIAL: CPT | Performed by: HOSPITALIST

## 2019-01-05 PROCEDURE — 85610 PROTHROMBIN TIME: CPT | Performed by: HOSPITALIST

## 2019-01-05 PROCEDURE — 76705 ECHO EXAM OF ABDOMEN: CPT

## 2019-01-05 PROCEDURE — 99253 IP/OBS CNSLTJ NEW/EST LOW 45: CPT | Performed by: INTERNAL MEDICINE

## 2019-01-05 PROCEDURE — 80053 COMPREHEN METABOLIC PANEL: CPT | Performed by: HOSPITALIST

## 2019-01-05 PROCEDURE — 25010000002 PERFLUTREN (DEFINITY) 8.476 MG IN SODIUM CHLORIDE 0.9 % 10 ML INJECTION: Performed by: HOSPITALIST

## 2019-01-05 PROCEDURE — 74178 CT ABD&PLV WO CNTR FLWD CNTR: CPT

## 2019-01-05 RX ORDER — OXYCODONE AND ACETAMINOPHEN 7.5; 325 MG/1; MG/1
1 TABLET ORAL EVERY 6 HOURS PRN
Status: DISCONTINUED | OUTPATIENT
Start: 2019-01-05 | End: 2019-01-07

## 2019-01-05 RX ORDER — BISACODYL 5 MG/1
5 TABLET, DELAYED RELEASE ORAL DAILY PRN
COMMUNITY

## 2019-01-05 RX ORDER — TRAZODONE HYDROCHLORIDE 50 MG/1
50 TABLET ORAL NIGHTLY
Status: DISCONTINUED | OUTPATIENT
Start: 2019-01-05 | End: 2019-01-09 | Stop reason: HOSPADM

## 2019-01-05 RX ORDER — ONDANSETRON 4 MG/1
4 TABLET, ORALLY DISINTEGRATING ORAL EVERY 6 HOURS PRN
Status: DISCONTINUED | OUTPATIENT
Start: 2019-01-05 | End: 2019-01-09 | Stop reason: HOSPADM

## 2019-01-05 RX ORDER — ONDANSETRON 4 MG/1
4 TABLET, FILM COATED ORAL EVERY 8 HOURS PRN
Status: DISCONTINUED | OUTPATIENT
Start: 2019-01-05 | End: 2019-01-07

## 2019-01-05 RX ORDER — ONDANSETRON HYDROCHLORIDE 8 MG/1
TABLET, FILM COATED ORAL EVERY 8 HOURS PRN
COMMUNITY

## 2019-01-05 RX ORDER — ONDANSETRON 2 MG/ML
4 INJECTION INTRAMUSCULAR; INTRAVENOUS EVERY 6 HOURS PRN
Status: DISCONTINUED | OUTPATIENT
Start: 2019-01-05 | End: 2019-01-07

## 2019-01-05 RX ORDER — VENLAFAXINE 37.5 MG/1
37.5 TABLET ORAL DAILY
COMMUNITY

## 2019-01-05 RX ORDER — HYDROMORPHONE HYDROCHLORIDE 1 MG/ML
0.5 INJECTION, SOLUTION INTRAMUSCULAR; INTRAVENOUS; SUBCUTANEOUS
Status: DISCONTINUED | OUTPATIENT
Start: 2019-01-05 | End: 2019-01-07

## 2019-01-05 RX ORDER — VENLAFAXINE 37.5 MG/1
37.5 TABLET ORAL DAILY
Status: DISCONTINUED | OUTPATIENT
Start: 2019-01-05 | End: 2019-01-06

## 2019-01-05 RX ORDER — SODIUM CHLORIDE 0.9 % (FLUSH) 0.9 %
3 SYRINGE (ML) INJECTION EVERY 12 HOURS SCHEDULED
Status: DISCONTINUED | OUTPATIENT
Start: 2019-01-05 | End: 2019-01-09 | Stop reason: HOSPADM

## 2019-01-05 RX ORDER — DOCUSATE SODIUM 100 MG/1
100 CAPSULE, LIQUID FILLED ORAL 2 TIMES DAILY
Status: DISCONTINUED | OUTPATIENT
Start: 2019-01-05 | End: 2019-01-07

## 2019-01-05 RX ORDER — ONDANSETRON 2 MG/ML
4 INJECTION INTRAMUSCULAR; INTRAVENOUS EVERY 6 HOURS PRN
Status: DISCONTINUED | OUTPATIENT
Start: 2019-01-05 | End: 2019-01-09 | Stop reason: HOSPADM

## 2019-01-05 RX ORDER — OXYCODONE AND ACETAMINOPHEN 7.5; 325 MG/1; MG/1
1 TABLET ORAL EVERY 4 HOURS PRN
Status: DISCONTINUED | OUTPATIENT
Start: 2019-01-05 | End: 2019-01-09 | Stop reason: HOSPADM

## 2019-01-05 RX ORDER — SUMATRIPTAN 100 MG/1
100 TABLET, FILM COATED ORAL
Status: DISCONTINUED | OUTPATIENT
Start: 2019-01-05 | End: 2019-01-07

## 2019-01-05 RX ORDER — OXYCODONE AND ACETAMINOPHEN 7.5; 325 MG/1; MG/1
1 TABLET ORAL EVERY 6 HOURS PRN
COMMUNITY

## 2019-01-05 RX ORDER — BISACODYL 5 MG/1
5 TABLET, DELAYED RELEASE ORAL DAILY PRN
Status: DISCONTINUED | OUTPATIENT
Start: 2019-01-05 | End: 2019-01-09 | Stop reason: HOSPADM

## 2019-01-05 RX ORDER — METHOCARBAMOL 750 MG/1
750 TABLET, FILM COATED ORAL 4 TIMES DAILY
Status: DISCONTINUED | OUTPATIENT
Start: 2019-01-05 | End: 2019-01-06

## 2019-01-05 RX ORDER — BUPROPION HYDROCHLORIDE 300 MG/1
300 TABLET ORAL DAILY
Status: DISCONTINUED | OUTPATIENT
Start: 2019-01-05 | End: 2019-01-06

## 2019-01-05 RX ORDER — SODIUM CHLORIDE 0.9 % (FLUSH) 0.9 %
3-10 SYRINGE (ML) INJECTION AS NEEDED
Status: DISCONTINUED | OUTPATIENT
Start: 2019-01-05 | End: 2019-01-09 | Stop reason: HOSPADM

## 2019-01-05 RX ORDER — METHOCARBAMOL 750 MG/1
750 TABLET, FILM COATED ORAL 3 TIMES DAILY
Status: DISCONTINUED | OUTPATIENT
Start: 2019-01-05 | End: 2019-01-05

## 2019-01-05 RX ORDER — HEPARIN SODIUM 10000 [USP'U]/100ML
17.8 INJECTION, SOLUTION INTRAVENOUS
Status: DISCONTINUED | OUTPATIENT
Start: 2019-01-05 | End: 2019-01-06

## 2019-01-05 RX ORDER — METHOCARBAMOL 750 MG/1
750 TABLET, FILM COATED ORAL 3 TIMES DAILY
COMMUNITY

## 2019-01-05 RX ORDER — DOCUSATE SODIUM 100 MG/1
100 CAPSULE, LIQUID FILLED ORAL 2 TIMES DAILY
COMMUNITY

## 2019-01-05 RX ORDER — ONDANSETRON 4 MG/1
4 TABLET, FILM COATED ORAL EVERY 6 HOURS PRN
Status: DISCONTINUED | OUTPATIENT
Start: 2019-01-05 | End: 2019-01-09 | Stop reason: HOSPADM

## 2019-01-05 RX ADMIN — HYDROMORPHONE HYDROCHLORIDE 0.5 MG: 1 INJECTION, SOLUTION INTRAMUSCULAR; INTRAVENOUS; SUBCUTANEOUS at 18:10

## 2019-01-05 RX ADMIN — IOPAMIDOL 85 ML: 612 INJECTION, SOLUTION INTRAVENOUS at 21:00

## 2019-01-05 RX ADMIN — DOCUSATE SODIUM 100 MG: 100 CAPSULE, LIQUID FILLED ORAL at 21:23

## 2019-01-05 RX ADMIN — HEPARIN SODIUM 17.8 UNITS/KG/HR: 10000 INJECTION, SOLUTION INTRAVENOUS at 09:39

## 2019-01-05 RX ADMIN — DIATRIZOATE MEGLUMINE AND DIATRIZOATE SODIUM 30 ML: 600; 100 SOLUTION ORAL; RECTAL at 18:34

## 2019-01-05 RX ADMIN — PERFLUTREN 3 ML: 6.52 INJECTION, SUSPENSION INTRAVENOUS at 18:45

## 2019-01-05 RX ADMIN — OXYCODONE HYDROCHLORIDE AND ACETAMINOPHEN 1 TABLET: 7.5; 325 TABLET ORAL at 12:07

## 2019-01-05 RX ADMIN — VENLAFAXINE 37.5 MG: 37.5 TABLET ORAL at 21:22

## 2019-01-05 RX ADMIN — BUPROPION HYDROCHLORIDE 300 MG: 300 TABLET, EXTENDED RELEASE ORAL at 21:21

## 2019-01-05 RX ADMIN — HYDROMORPHONE HYDROCHLORIDE 0.5 MG: 1 INJECTION, SOLUTION INTRAMUSCULAR; INTRAVENOUS; SUBCUTANEOUS at 09:42

## 2019-01-05 RX ADMIN — SODIUM CHLORIDE, PRESERVATIVE FREE 3 ML: 5 INJECTION INTRAVENOUS at 21:24

## 2019-01-05 RX ADMIN — HYDROMORPHONE HYDROCHLORIDE 0.5 MG: 1 INJECTION, SOLUTION INTRAMUSCULAR; INTRAVENOUS; SUBCUTANEOUS at 15:25

## 2019-01-05 RX ADMIN — HYDROMORPHONE HYDROCHLORIDE 0.5 MG: 1 INJECTION, SOLUTION INTRAMUSCULAR; INTRAVENOUS; SUBCUTANEOUS at 12:07

## 2019-01-05 RX ADMIN — METHOCARBAMOL TABLETS 750 MG: 750 TABLET, COATED ORAL at 12:07

## 2019-01-05 RX ADMIN — HYDROMORPHONE HYDROCHLORIDE 0.5 MG: 1 INJECTION, SOLUTION INTRAMUSCULAR; INTRAVENOUS; SUBCUTANEOUS at 19:26

## 2019-01-05 RX ADMIN — HYDROMORPHONE HYDROCHLORIDE 0.5 MG: 1 INJECTION, SOLUTION INTRAMUSCULAR; INTRAVENOUS; SUBCUTANEOUS at 21:29

## 2019-01-05 NOTE — PLAN OF CARE
Problem: Patient Care Overview  Goal: Plan of Care Review  Outcome: Ongoing (interventions implemented as appropriate)   01/05/19 8468   Coping/Psychosocial   Plan of Care Reviewed With patient;spouse   Plan of Care Review   Progress no change   OTHER   Outcome Summary AAO x all. VSS. NSR and sometimes low 100s heartrate. Bedrest with pivot to BSC only. Routine pain meds. Oxygen continuously re PE/infarction. Has calf DVT which is new. Liver symptoms. Further testing and consults in progress. Much family support. Will continue to monitor.       Problem: Fall Risk (Adult)  Goal: Absence of Fall  Outcome: Ongoing (interventions implemented as appropriate)      Problem: VTE, DVT and PE (Adult)  Goal: Signs and Symptoms of Listed Potential Problems Will be Absent, Minimized or Managed (VTE, DVT and PE)  Outcome: Ongoing (interventions implemented as appropriate)

## 2019-01-05 NOTE — CONSULTS
Referring Provider: Dr. Magana  Reason for Consultation: PE    Patient Care Team:  Chandan Rubio DO as PCP - General  Chandan Rubio DO as PCP - Family Medicine    Chief complaint:   Back pain    History of present illness:  This is a 44-year-old female patient with history of mild intermittent asthma which is controlled.  She has no prior history of DVT/PE.  She had an elective tummy tuck and breast augmentation on 12/28/18.  Following surgery, she had significant abdominal pain and breast pain and was almost bedridden for 2 days.  She was only getting up to use the bathroom about 4 times a day, walking to 20 steps only.    On Tuesday and Wednesday, she developed right sided posterior back pain with no radiation.  Pain was severe in intensity, reaching 8/10.  It was partially alleviated by the use of Percocet 10 every 6 hours in addition to a muscle relaxant.  Pain was not radiating and it was dull aching in nature.  Pain was worse with deep breathing and movement.  It was associated with shortness of breath.  She went to her surgeon for follow-up on Friday and was told to go to the emergency department if her pain persists.  She went to the emergency room at [facility] he did CT scan which showed pulmonary emboli in the right lower lobe and small bilateral pleural effusions and she was started on a heparin drip and transferred to [facility] for further evaluation treatment.    On my assessment, her pain was persistent but slightly improved.  She is on oxygen 2 L/m.  She does not use oxygen at home.  Her SPO2 is 95%.    Labs reviewed:  ; AST 1152; bicarbonate 27;WBC 12; hemoglobin 10        Review of Systems  Constitutional: No fever or chills.   ENMT: No sinus congestion.loud snoring.     Cardiovascular: No chest pain, palpitation or legs swelling.    Respiratory: dyspnea.  No cough or hemoptysis.    Gastrointestinal: No constipation, diarrhea  but  abdominal pain at the site of the surgery.  No nausea or vomiting   Neurology: No headache, weakness, numbness or dizziness.   Musculoskeletal: No joints pain, stiffness or swelling.  back pain as above.    Psychiatry: No depression.  Genitourinary: No dysuria or frequent urination  Endo: No weight changes. No cold or warm intolerance.  Lymphatic: No swollen glands.  Integumentary: No rash.    History  Past Medical History:   Diagnosis Date   • Asthma    ,   Past Surgical History:   Procedure Laterality Date   • ABDOMINAL SURGERY     • CHOLECYSTECTOMY  2011   ,   Family History   Problem Relation Age of Onset   • Hyperlipidemia Mother    ,   Social History     Tobacco Use   • Smoking status: Never Smoker   • Smokeless tobacco: Never Used   Substance Use Topics   • Alcohol use: Yes     Frequency: Never     Comment: occasional   • Drug use: No   ,   Medications Prior to Admission   Medication Sig Dispense Refill Last Dose   • buPROPion XL (WELLBUTRIN XL) 300 MG 24 hr tablet TAKE 1 TABLET BY MOUTH DAILY. - PLEASE CALL THE OFFICE FOR AN APPT. (Patient taking differently: Take 150 mg by mouth Daily. - Please call the office for an appt.) 7 tablet 0 Patient Taking Differently at Unknown time   • docusate sodium (COLACE) 100 MG capsule Take 100 mg by mouth 2 (Two) Times a Day.   1/5/2019 at Unknown time   • methocarbamol (ROBAXIN) 750 MG tablet Take 750 mg by mouth 3 (Three) Times a Day.   1/5/2019 at Unknown time   • ondansetron (ZOFRAN) 8 MG tablet Take  by mouth Every 8 (Eight) Hours As Needed for Nausea or Vomiting.   Past Week at Unknown time   • oxyCODONE-acetaminophen (PERCOCET) 7.5-325 MG per tablet Take 1 tablet by mouth Every 6 (Six) Hours As Needed.   1/5/2019 at Unknown time   • pravastatin (PRAVACHOL) 10 MG tablet Take 1 tablet by mouth Daily. (Patient taking differently: Take 10 mg by mouth Every Night.) 30 tablet 5 Past Week at Unknown time   • SUMAtriptan (IMITREX) 100 MG tablet Take one tablet at onset of  headache. May repeat dose one time in 2 hours if headache not relieved. 9 tablet 5 Past Month at Unknown time   • venlafaxine (EFFEXOR) 37.5 MG tablet Take 37.5 mg by mouth Daily.   1/4/2019 at Unknown time   • Albuterol Sulfate (PROAIR RESPICLICK IN) Inhale every 6 (six) hours as needed.   More than a month at Unknown time   • atorvastatin (LIPITOR) 40 MG tablet TAKE 1 TABLET BY MOUTH DAILY. 30 tablet 0    • bisacodyl (DULCOLAX) 5 MG EC tablet Take 5 mg by mouth Daily As Needed for Constipation.      • buPROPion XL (WELLBUTRIN XL) 300 MG 24 hr tablet TAKE 1 TABLET BY MOUTH DAILY. - PLEASE CALL THE OFFICE FOR AN APPT. 15 tablet 0    • cetirizine (ZyrTEC) 10 MG tablet Take 10 mg by mouth Daily.   More than a month at Unknown time   • omeprazole (priLOSEC) 40 MG capsule TAKE 1 CAPSULE BY MOUTH DAILY 90 capsule 1    • traZODone (DESYREL) 50 MG tablet Take 1 tablet by mouth Every Night. 90 tablet 1 More than a month at Unknown time   • vitamin C (ASCORBIC ACID) 250 MG tablet Take 250 mg by mouth Daily.   Unknown at Unknown time   , Scheduled Meds:    buPROPion  mg Oral Daily   diatrizoate meglumine-sodium 30 mL Oral Once   docusate sodium 100 mg Oral BID   methocarbamol 750 mg Oral 4x Daily   sodium chloride 3 mL Intravenous Q12H   traZODone 50 mg Oral Nightly   venlafaxine 37.5 mg Oral Daily   , Continuous Infusions:    heparin (porcine) 17.8 Units/kg/hr Last Rate: 17.8 Units/kg/hr (01/05/19 0939)    and Allergies:  Chlorcyclizine-pseudoephed; Cimetidine; Diclofenac; and Peanut-containing drug products    Objective     Vital Signs   Temp:  [98.2 °F (36.8 °C)] 98.2 °F (36.8 °C)  Heart Rate:  [] 99  Resp:  [16-20] 20  BP: (122-131)/(65-75) 126/73    Physical Exam:  Constitutional: Not in acute distress.  Eyes: Injected conjunctiva, EOMI.  ENMT: Garcia 3. No oral thrush. Tonsils grade   Neck: Large. Trachea midline. No thyromegaly  Heart: RRR, no murmur  Lungs: Slightly diminished air entry on anterior  auscultation.  No wheezing.  No crackles.  Respiratory effort is normal with no use of respiratory accessory muscles      Abdomen: Obese. Soft. No tenderness or dullness. No HSM.  Extremities: No cyanosis, clubbing or pitting edema: . Moves all extremities.  Neuro: Conscious, alert, oriented x3  Psych: Appropriate mood and affect.    Integumentary: No rash  Lymphatic: No palpable cervical or supraclavicular lymph nodes.            Diagnostic imaging:  I personally and independently reviewed the following images:     CTA chest 1/5/18:   Elevated right hemidiaphragm.  Pulmonary embolus in the right lower lobe segmental artery.  Bilateral lower lobe consolidation more prominent on the right, wedge-shaped.        Assessment:    1. Acute right lower lobe segmental pulmonary embolism  2. Acute left lower extremity deep, gastrocnemius/soleal, likely provoked by recent surgery and immobilization   3. Pleuritic right sided chest/back pain, secondary to pulmonary infarct  4. right lower lobe pulmonary infarct  5. Bilateral lower lobe atelectasis     other pertinent medical problems  6. Elevated transaminase  7. Asthma, mild intermittent    Plan:  Agree with heparin drip for now.  Doubt submassive PE.  Agree with echocardiogram.  We'll transition to oral anticoagulation later on and prior to discharge.  She needs to be anticoagulated for 3-6 month.    Start incentive spirometry for atelectasis.    Elevated transaminases can occur in cor pulmonale will with right-sided heart failure but I doubt that.  Awaiting echocardiogram. Check ProBNP    Pain management for the pleuritic chest pain.    Will start when necessary bronchodilators if needed.  No current evidence of asthma exacerbation    Aníbal Reeder MD  01/05/19  6:17 PM

## 2019-01-05 NOTE — H&P
HISTORY AND PHYSICAL   [facility]        Patient Identification:  Name: Merari Bowen  Age: 44 y.o.  Sex: female  :  1975  MRN: [id number]                     Primary Care Physician: Chandan Rubio DO    Chief Complaint:  Pain and short of air.    History of Present Illness:            The patient is a 44-year-old white female with history of depression and hyperlipidemia who recently had undergone plastic surgery with Dr. Cindy ayon and breast augmentation in the last week who presents with history of having shortness of air and back pain and right-sided abdominal pain and right shoulder pain.  She gone to the emergency room at [facility] he did CT scan which showed pulmonary emboli in the right lower lobe and small bilateral pleural effusions and she was started on a heparin drip and transferred to [facility] for further evaluation treatment.  She's not had any nausea vomiting.  She denies having any fever or chills.  Also on admission some repeat lab tests showed elevated transaminases.  Her liver tests were normal initially in the ER [facility].  She also had venous duplex of lower extremities which showed a Vein thrombus on the left.  The patient's admitted to the hospital for further evaluation treatment of her symptoms with pulmonary emboli.    Past Medical History:  No past medical history on file.  Past Surgical History:  No past surgical history on file.   Home Meds:  Medications Prior to Admission   Medication Sig Dispense Refill Last Dose   • buPROPion XL (WELLBUTRIN XL) 300 MG 24 hr tablet TAKE 1 TABLET BY MOUTH DAILY. - PLEASE CALL THE OFFICE FOR AN APPT. (Patient taking differently: Take 150 mg by mouth Daily. - Please call the office for an appt.) 7 tablet 0 Patient Taking Differently at Unknown time   • docusate sodium (COLACE) 100 MG capsule Take 100 mg by mouth 2 (Two) Times a Day.   2019 at Unknown time   • methocarbamol (ROBAXIN)  750 MG tablet Take 750 mg by mouth 3 (Three) Times a Day.   1/5/2019 at Unknown time   • ondansetron (ZOFRAN) 8 MG tablet Take  by mouth Every 8 (Eight) Hours As Needed for Nausea or Vomiting.   Past Week at Unknown time   • oxyCODONE-acetaminophen (PERCOCET) 7.5-325 MG per tablet Take 1 tablet by mouth Every 6 (Six) Hours As Needed.   1/5/2019 at Unknown time   • pravastatin (PRAVACHOL) 10 MG tablet Take 1 tablet by mouth Daily. (Patient taking differently: Take 10 mg by mouth Every Night.) 30 tablet 5 Past Week at Unknown time   • SUMAtriptan (IMITREX) 100 MG tablet Take one tablet at onset of headache. May repeat dose one time in 2 hours if headache not relieved. 9 tablet 5 Past Month at Unknown time   • venlafaxine (EFFEXOR) 37.5 MG tablet Take 37.5 mg by mouth Daily.   1/4/2019 at Unknown time   • Albuterol Sulfate (PROAIR RESPICLICK IN) Inhale every 6 (six) hours as needed.   More than a month at Unknown time   • atorvastatin (LIPITOR) 40 MG tablet TAKE 1 TABLET BY MOUTH DAILY. 30 tablet 0    • bisacodyl (DULCOLAX) 5 MG EC tablet Take 5 mg by mouth Daily As Needed for Constipation.      • buPROPion XL (WELLBUTRIN XL) 300 MG 24 hr tablet TAKE 1 TABLET BY MOUTH DAILY. - PLEASE CALL THE OFFICE FOR AN APPT. 15 tablet 0    • cetirizine (ZyrTEC) 10 MG tablet Take 10 mg by mouth Daily.   More than a month at Unknown time   • omeprazole (priLOSEC) 40 MG capsule TAKE 1 CAPSULE BY MOUTH DAILY 90 capsule 1    • traZODone (DESYREL) 50 MG tablet Take 1 tablet by mouth Every Night. 90 tablet 1 More than a month at Unknown time   • vitamin C (ASCORBIC ACID) 250 MG tablet Take 250 mg by mouth Daily.   Unknown at Unknown time     Current meds    Current Facility-Administered Medications:   •  diatrizoate meglumine-sodium (GASTROGRAFIN) 66-10 % solution 30 mL, 30 mL, Oral, Once, Joshua Magana MD  •  heparin 46696 units/250 mL (100 units/mL) in 0.45 % NaCl infusion, 17.8 Units/kg/hr, Intravenous, Titrated, Joshua Magana MD,  Last Rate: 14.98 mL/hr at 01/05/19 0939, 17.8 Units/kg/hr at 01/05/19 0939  •  HYDROmorphone (DILAUDID) injection 0.5 mg, 0.5 mg, Intravenous, Q1H PRN, Joshua Magana MD, 0.5 mg at 01/05/19 1525  •  methocarbamol (ROBAXIN) tablet 750 mg, 750 mg, Oral, 4x Daily, Joshua Magana MD, 750 mg at 01/05/19 1207  •  ondansetron (ZOFRAN) injection 4 mg, 4 mg, Intravenous, Q6H PRN, Joshua Magana MD  •  oxyCODONE-acetaminophen (PERCOCET) 7.5-325 MG per tablet 1 tablet, 1 tablet, Oral, Q6H PRN, Joshua Magana MD, 1 tablet at 01/05/19 1207  Allergies:  Allergies   Allergen Reactions   • Chlorcyclizine-Pseudoephed    • Cimetidine    • Diclofenac    • Peanut-Containing Drug Products Hives     Immunizations:    There is no immunization history on file for this patient.  Social History:   Social History     Social History Narrative   • Not on file     Social History     Socioeconomic History   • Marital status:      Spouse name: Not on file   • Number of children: Not on file   • Years of education: Not on file   • Highest education level: Not on file   Social Needs   • Financial resource strain: Not on file   • Food insecurity - worry: Not on file   • Food insecurity - inability: Not on file   • Transportation needs - medical: Not on file   • Transportation needs - non-medical: Not on file   Occupational History   • Not on file   Tobacco Use   • Smoking status: Never Smoker   • Smokeless tobacco: Never Used   Substance and Sexual Activity   • Alcohol use: Yes     Comment: occasional   • Drug use: No   • Sexual activity: Not on file   Other Topics Concern   • Not on file   Social History Narrative   • Not on file       Family History:  Family History   Problem Relation Age of Onset   • Hyperlipidemia Mother         Review of Systems  See history of present illness and past medical history.  Patient denies headache, dizziness, syncope, falls, trauma, change in vision, change in hearing, change in taste, changes in weight,  "changes in appetite, focal weakness, numbness, or paresthesia.  Patient denies  palpitations,  orthopnea, PND, cough, sinus pressure, rhinorrhea, epistaxis, hemoptysis, nausea, vomiting, hematemesis, diarrhea, constipation or hematchezia.  Denies cold or heat intolerance, polydipsia, polyuria, polyphagia. Denies hematuria, pyuria, dysuria, hesitancy, frequency or urgency.  Denies fever, chills, sweats, night sweats.   Remainder of ROS is negative.    Objective:  tMax 24 hrs: Temp (24hrs), Av.2 °F (36.8 °C), Min:98.2 °F (36.8 °C), Max:98.2 °F (36.8 °C)    Vitals Ranges:   Temp:  [98.2 °F (36.8 °C)] 98.2 °F (36.8 °C)  Heart Rate:  [] 99  Resp:  [16-20] 20  BP: (122-131)/(65-75) 126/73      Exam:  /73   Pulse 99   Temp 98.2 °F (36.8 °C) (Oral)   Resp 20   Ht 172.7 cm (68\")   Wt 84.2 kg (185 lb 10 oz)   LMP  (Within Years) Comment: 6 years  SpO2 96%   BMI 28.22 kg/m²     General Appearance:    Alert, cooperative, no distress, appears stated age   Head:    Normocephalic, without obvious abnormality, atraumatic   Eyes:    PERRL, conjunctiva/corneas clear, EOM's intact, both eyes   Ears:    Normal external ear canals, both ears   Nose:   Nares normal, septum midline, mucosa normal, no drainage    or sinus tenderness   Throat:   Lips, mucosa, and tongue normal   Neck:   Supple, symmetrical, trachea midline, no adenopathy;     thyroid:  no enlargement/tenderness/nodules; no carotid    bruit or JVD   Back:     Symmetric, no curvature, ROM normal, no CVA tenderness   Lungs:     Clear to auscultation bilaterally, respirations unlabored   Chest Wall:    Surgical changes from breast augmentation    Heart:    Regular rate and rhythm, S1 and S2 normal, no murmur, rub   or gallop   Abdomen:     Soft,some tenderness and surgical changes from recent tummy tuck, bowel sounds active all four quadrants,     no masses, no hepatomegaly, no splenomegaly   Extremities:   Extremities normal, atraumatic, no cyanosis or " edema   Pulses:   2+ and symmetric all extremities   Skin:   Skin color, texture, turgor normal, no rashes or lesions   Lymph nodes:   Cervical, supraclavicular, and axillary nodes normal   Neurologic:   CNII-XII intact, normal strength, sensation intact throughout      .    Data Review:  Lab Results (last 72 hours)     Procedure Component Value Units Date/Time    Comprehensive Metabolic Panel [[id number]]  (Abnormal) Collected:  01/05/19 1409    Specimen:  Blood Updated:  01/05/19 1511     Glucose 103 mg/dL      BUN 13 mg/dL      Creatinine 0.72 mg/dL      Sodium 139 mmol/L      Potassium 3.6 mmol/L      Chloride 100 mmol/L      CO2 27.1 mmol/L      Calcium 8.8 mg/dL      Total Protein 6.8 g/dL      Albumin 3.00 g/dL      ALT (SGPT) 785 U/L      AST (SGOT) 1,152 U/L      Alkaline Phosphatase 439 U/L      Total Bilirubin 1.1 mg/dL      eGFR Non African Amer 87 mL/min/1.73      Globulin 3.8 gm/dL      A/G Ratio 0.8 g/dL      BUN/Creatinine Ratio 18.1     Anion Gap 11.9 mmol/L     aPTT [[id number]]  (Abnormal) Collected:  01/05/19 1409    Specimen:  Blood from Arm, Right Updated:  01/05/19 1448     PTT 96.6 seconds     CBC & Differential [[id number]] Collected:  01/05/19 1006    Specimen:  Blood Updated:  01/05/19 1122    Narrative:       The following orders were created for panel order CBC & Differential.  Procedure                               Abnormality         Status                     ---------                               -----------         ------                     CBC Auto Differential[[id number]]        Abnormal            Final result                 Please view results for these tests on the individual orders.    CBC Auto Differential [[id number]]  (Abnormal) Collected:  01/05/19 1006    Specimen:  Blood Updated:  01/05/19 1122     WBC 13.32 10*3/mm3      RBC 3.40 10*6/mm3      Hemoglobin 10.6 g/dL      Hematocrit 31.1 %      MCV 91.5 fL      MCH 31.2 pg      MCHC 34.1 g/dL      RDW 12.5 %      RDW-SD  41.9 fl      MPV 10.4 fL      Platelets 294 10*3/mm3      Neutrophil % 80.4 %      Lymphocyte % 8.3 %      Monocyte % 9.7 %      Eosinophil % 1.4 %      Basophil % 0.2 %      Immature Grans % 0.9 %      Neutrophils, Absolute 10.70 10*3/mm3      Lymphocytes, Absolute 1.11 10*3/mm3      Monocytes, Absolute 1.29 10*3/mm3      Eosinophils, Absolute 0.19 10*3/mm3      Basophils, Absolute 0.03 10*3/mm3      Immature Grans, Absolute 0.12 10*3/mm3     Comprehensive Metabolic Panel [[id number]]  (Abnormal) Collected:  01/05/19 1006    Specimen:  Blood Updated:  01/05/19 1046     Glucose 105 mg/dL      BUN 11 mg/dL      Creatinine 0.64 mg/dL      Sodium 139 mmol/L      Potassium 3.9 mmol/L      Chloride 100 mmol/L      CO2 28.6 mmol/L      Calcium 8.6 mg/dL      Total Protein 6.6 g/dL      Albumin 3.00 g/dL      ALT (SGPT) 332 U/L      AST (SGOT) 656 U/L      Alkaline Phosphatase 267 U/L      Total Bilirubin 1.0 mg/dL      eGFR Non African Amer 100 mL/min/1.73      Globulin 3.6 gm/dL      A/G Ratio 0.8 g/dL      BUN/Creatinine Ratio 17.2     Anion Gap 10.4 mmol/L     Protime-INR [[id number]]  (Abnormal) Collected:  01/05/19 1006    Specimen:  Blood Updated:  01/05/19 1039     Protime 16.2 Seconds      INR 1.33    aPTT [[id number]]  (Abnormal) Collected:  01/05/19 1006    Specimen:  Blood Updated:  01/05/19 1039     PTT 94.5 seconds         Results from last 7 days   Lab Units  01/05/19   1409  01/05/19   1006   SODIUM mmol/L  139  139   POTASSIUM mmol/L  3.6  3.9   CHLORIDE mmol/L  100  100   CO2 mmol/L  27.1  28.6   BUN mg/dL  13  11   CREATININE mg/dL  0.72  0.64   CALCIUM mg/dL  8.8  8.6   BILIRUBIN mg/dL  1.1  1.0   ALK PHOS U/L  439*  267*   ALT (SGPT) U/L  785*  332*   AST (SGOT) U/L  1,152*  656*   GLUCOSE mg/dL  103*  105*                  Imaging Results (all)     None        No past medical history on file.    Assessment:  Active Hospital Problems    Diagnosis Date Noted   • **Pulmonary emboli (CMS/HCC) [I26.99]  01/05/2019   • Acute deep vein thrombosis (DVT) of distal vein of left lower extremity (CMS/HCC) [I82.4Z2] 01/05/2019   • Elevated liver function tests [R94.5] 01/05/2019   • Depression [F32.9] 01/03/2017   • Dyslipidemia [E78.5] 03/09/2016      Resolved Hospital Problems   No resolved problems to display.       Plan:  The patient's admitted to the hospital and will continue with heparin drip for acute pulmonary emboli and DVT.  We'll consult pulmonary.  We'll also ask for GI consult to evaluate elevated liver function tests.  We'll check CT scan of abdomen with and without contrast to evaluate the liver and also check gallbladder ultrasound.  We'll get echocardiogram.  I've ordered medication for pain.  We'll check some follow-up laboratory studies.    Joshua Magana MD  1/5/2019  5:27 PM

## 2019-01-05 NOTE — CONSULTS
[facility], [place], operated by [facility]  Initial Inpatient Consult Note    Referring Provider: [facility]    Reason for Consultation: Elevated LFTs    Subjective     History of present illness:    44 y.o. female , not previously known to our service, that we are asked to see for elevated LFTs.  The patient was transferred from an [facility] with an acute pulmonary embolism.  She had recent plastic surgery and developed the acute onset of right-sided chest pain and shortness of air.  She was evaluated at an [facility] and found to have a pulmonary embolism.  LFTs performed this morning at 1 AM were normal.  She has no history of prior liver disease.  She has no family history of liver disease.  She has no risk factors for viral hepatitis.  She denies any history of alcohol abuse.    She denies any abdominal pain with the exception of discomfort secondary to her recent surgery.  She has not had any lower extremity swelling or pain.  She has no history of prior thrombosis.  She has never had surgery prior to this but she has had 4 children vaginally.    Review of her labs from [facility] indicate that she had normal LFTs at 1 AM this morning.  There was no documented hypotension.  She has been on Tylenol-containing pain medication.    Past Medical History:  Past Medical History:   Diagnosis Date   • Asthma     exercise induced     Past Surgical History:  Past Surgical History:   Procedure Laterality Date   • ABDOMINAL SURGERY     • CHOLECYSTECTOMY  2011   • ENDOMETRIAL ABLATION        Social History:   Social History     Tobacco Use   • Smoking status: Never Smoker   • Smokeless tobacco: Never Used   Substance Use Topics   • Alcohol use: Yes     Frequency: Never     Comment: occasional      Family History:  Family History   Problem Relation Age of Onset   • Hyperlipidemia Mother        Home Meds:  Medications Prior to Admission   Medication Sig Dispense Refill Last Dose   • buPROPion XL (WELLBUTRIN XL) 300 MG  24 hr tablet TAKE 1 TABLET BY MOUTH DAILY. - PLEASE CALL THE OFFICE FOR AN APPT. (Patient taking differently: Take 150 mg by mouth Daily. - Please call the office for an appt.) 7 tablet 0 Patient Taking Differently at Unknown time   • docusate sodium (COLACE) 100 MG capsule Take 100 mg by mouth 2 (Two) Times a Day.   1/5/2019 at Unknown time   • methocarbamol (ROBAXIN) 750 MG tablet Take 750 mg by mouth 3 (Three) Times a Day.   1/5/2019 at Unknown time   • ondansetron (ZOFRAN) 8 MG tablet Take  by mouth Every 8 (Eight) Hours As Needed for Nausea or Vomiting.   Past Week at Unknown time   • oxyCODONE-acetaminophen (PERCOCET) 7.5-325 MG per tablet Take 1 tablet by mouth Every 6 (Six) Hours As Needed.   1/5/2019 at Unknown time   • pravastatin (PRAVACHOL) 10 MG tablet Take 1 tablet by mouth Daily. (Patient taking differently: Take 10 mg by mouth Every Night.) 30 tablet 5 Past Week at Unknown time   • SUMAtriptan (IMITREX) 100 MG tablet Take one tablet at onset of headache. May repeat dose one time in 2 hours if headache not relieved. 9 tablet 5 Past Month at Unknown time   • venlafaxine (EFFEXOR) 37.5 MG tablet Take 37.5 mg by mouth Daily.   1/4/2019 at Unknown time   • Albuterol Sulfate (PROAIR RESPICLICK IN) Inhale every 6 (six) hours as needed.   More than a month at Unknown time   • atorvastatin (LIPITOR) 40 MG tablet TAKE 1 TABLET BY MOUTH DAILY. 30 tablet 0    • bisacodyl (DULCOLAX) 5 MG EC tablet Take 5 mg by mouth Daily As Needed for Constipation.      • buPROPion XL (WELLBUTRIN XL) 300 MG 24 hr tablet TAKE 1 TABLET BY MOUTH DAILY. - PLEASE CALL THE OFFICE FOR AN APPT. 15 tablet 0    • cetirizine (ZyrTEC) 10 MG tablet Take 10 mg by mouth Daily.   More than a month at Unknown time   • omeprazole (priLOSEC) 40 MG capsule TAKE 1 CAPSULE BY MOUTH DAILY 90 capsule 1    • traZODone (DESYREL) 50 MG tablet Take 1 tablet by mouth Every Night. 90 tablet 1 More than a month at Unknown time   • vitamin C (ASCORBIC ACID)  250 MG tablet Take 250 mg by mouth Daily.   Unknown at Unknown time     Current Meds:     buPROPion  mg Oral Daily   diatrizoate meglumine-sodium 30 mL Oral Once   docusate sodium 100 mg Oral BID   methocarbamol 750 mg Oral 4x Daily   sodium chloride 3 mL Intravenous Q12H   traZODone 50 mg Oral Nightly   venlafaxine 37.5 mg Oral Daily     Allergies:  Allergies   Allergen Reactions   • Chlorcyclizine-Pseudoephed    • Cimetidine    • Diclofenac    • Peanut-Containing Drug Products Hives     Review of Systems  Pertinent items are noted in HPI, all other systems reviewed and negative     Objective     Vital Signs  Temp:  [98.2 °F (36.8 °C)] 98.2 °F (36.8 °C)  Heart Rate:  [] 99  Resp:  [16-20] 20  BP: (122-131)/(65-75) 126/73  Physical Exam:  General Appearance:    Alert, cooperative, appears uncomfortable    Head:    Normocephalic, without obvious abnormality, atraumatic   Eyes:            Lids and lashes normal, conjunctivae and sclerae normal, no   icterus   Throat:   No oral lesions, no thrush, oral mucosa moist       Lungs:     Clear to auscultation,respirations regular, even and                   unlabored    Heart:    Regular rhythm and normal rate, normal S1 and S2, no            Murmur    Chest Wall:    Drain in place from recent surgery    Abdomen:     Normal bowel sounds, no masses, no organomegaly, soft        non-tender, non-distended, no guarding, no rebound                 tenderness   Rectal:     Deferred   Extremities:   no edema, no calf tenderness    Skin:   No bleeding, bruising or rash, incisions in abdomen and chest from recent surgery        Psychiatric:  Judgement and insight: normal   Orientation to person place and time: normal   Mood and affect: normal   Results Review:   I reviewed the patient's new clinical results.    Results from last 7 days   Lab Units  01/05/19   1006   WBC 10*3/mm3  13.32*   HEMOGLOBIN g/dL  10.6*   HEMATOCRIT %  31.1*   PLATELETS 10*3/mm3  294     Results  from last 7 days   Lab Units  01/05/19   1409  01/05/19   1006   SODIUM mmol/L  139  139   POTASSIUM mmol/L  3.6  3.9   CHLORIDE mmol/L  100  100   CO2 mmol/L  27.1  28.6   BUN mg/dL  13  11   CREATININE mg/dL  0.72  0.64   CALCIUM mg/dL  8.8  8.6   BILIRUBIN mg/dL  1.1  1.0   ALK PHOS U/L  439*  267*   ALT (SGPT) U/L  785*  332*   AST (SGOT) U/L  1,152*  656*   GLUCOSE mg/dL  103*  105*     Results from last 7 days   Lab Units  01/05/19   1006   INR   1.33*     Lab Results   Lab Value Date/Time    LIPASE 33 03/12/2015 1405       Radiology:  CT Abdomen Pelvis With & Without Contrast    (Results Pending)   US Gallbladder    (Results Pending)       Assessment/Plan   Patient Active Problem List   Diagnosis   • Atopic dermatitis   • Carpal tunnel syndrome   • Dyslipidemia   • Fatigue   • Cervical pain   • Vitamin D deficiency   • Depression   • Pulmonary emboli (CMS/HCC)   • Acute deep vein thrombosis (DVT) of distal vein of left lower extremity (CMS/HCC)   • Elevated liver function tests     Assessment-  1.  Acute elevation in LFTs-differential includes ischemic hepatitis secondary to PE, thrombosis in the hepatic or portal vessels and Tylenol toxicity given the severity of the elevations  2.  Acute pulmonary embolism-likely secondary to recent surgery, no prior thrombosis    Plan-  - Blood pressures been stable-continue to follow  - CT of the abdomen and pelvis has been ordered-we'll follow-up on the results of this test to evaluate for thrombosis.  She will be on a heparin drip.  - We'll check a Tylenol level but she has been taking her pain medications appropriately  - Close follow-up of LFTs and her INR to ensure continued stable hepatic function    I discussed the patients findings and my recommendations with patient, family and nursing staff.    Susy Larios MD

## 2019-01-05 NOTE — PROGRESS NOTES
BLEV doppler completed.  Preliminary results:  RT leg is negative.  LT leg is positive for new calf DVT with no extension into the popliteal.  Results given to JV Kingsley.

## 2019-01-06 ENCOUNTER — APPOINTMENT (OUTPATIENT)
Dept: CARDIOLOGY | Facility: HOSPITAL | Age: 44
End: 2019-01-06
Attending: INTERNAL MEDICINE

## 2019-01-06 LAB
ALBUMIN SERPL-MCNC: 2.8 G/DL (ref 3.5–5.2)
ALBUMIN/GLOB SERPL: 0.7 G/DL
ALP SERPL-CCNC: 411 U/L (ref 39–117)
ALT SERPL W P-5'-P-CCNC: 569 U/L (ref 1–33)
ANION GAP SERPL CALCULATED.3IONS-SCNC: 11 MMOL/L
APTT PPP: 90.2 SECONDS (ref 22.7–35.4)
AST SERPL-CCNC: 413 U/L (ref 1–32)
BASOPHILS # BLD AUTO: 0.02 10*3/MM3 (ref 0–0.2)
BASOPHILS NFR BLD AUTO: 0.2 % (ref 0–1.5)
BH CV ECHO MEAS - ACS: 2.3 CM
BH CV ECHO MEAS - AO MAX PG (FULL): 2.1 MMHG
BH CV ECHO MEAS - AO MAX PG: 9.2 MMHG
BH CV ECHO MEAS - AO MEAN PG (FULL): 0 MMHG
BH CV ECHO MEAS - AO MEAN PG: 5 MMHG
BH CV ECHO MEAS - AO ROOT AREA (BSA CORRECTED): 1.4
BH CV ECHO MEAS - AO ROOT AREA: 6.2 CM^2
BH CV ECHO MEAS - AO ROOT DIAM: 2.8 CM
BH CV ECHO MEAS - AO V2 MAX: 152 CM/SEC
BH CV ECHO MEAS - AO V2 MEAN: 99.8 CM/SEC
BH CV ECHO MEAS - AO V2 VTI: 33.7 CM
BH CV ECHO MEAS - AVA(I,A): 1.9 CM^2
BH CV ECHO MEAS - AVA(I,D): 1.9 CM^2
BH CV ECHO MEAS - AVA(V,A): 2 CM^2
BH CV ECHO MEAS - AVA(V,D): 2 CM^2
BH CV ECHO MEAS - BSA(HAYCOCK): 2.1 M^2
BH CV ECHO MEAS - BSA: 2 M^2
BH CV ECHO MEAS - BZI_BMI: 29.8 KILOGRAMS/M^2
BH CV ECHO MEAS - BZI_METRIC_HEIGHT: 172.7 CM
BH CV ECHO MEAS - BZI_METRIC_WEIGHT: 89 KG
BH CV ECHO MEAS - EDV(CUBED): 103.8 ML
BH CV ECHO MEAS - EDV(TEICH): 102.4 ML
BH CV ECHO MEAS - EF(CUBED): 76.5 %
BH CV ECHO MEAS - EF(TEICH): 68.5 %
BH CV ECHO MEAS - ESV(CUBED): 24.4 ML
BH CV ECHO MEAS - ESV(TEICH): 32.2 ML
BH CV ECHO MEAS - FS: 38.3 %
BH CV ECHO MEAS - IVS/LVPW: 0.92
BH CV ECHO MEAS - IVSD: 1.1 CM
BH CV ECHO MEAS - LV MASS(C)D: 199.6 GRAMS
BH CV ECHO MEAS - LV MASS(C)DI: 98.5 GRAMS/M^2
BH CV ECHO MEAS - LV MAX PG: 7.2 MMHG
BH CV ECHO MEAS - LV MEAN PG: 5 MMHG
BH CV ECHO MEAS - LV V1 MAX: 134 CM/SEC
BH CV ECHO MEAS - LV V1 MEAN: 100 CM/SEC
BH CV ECHO MEAS - LV V1 VTI: 27.5 CM
BH CV ECHO MEAS - LVIDD: 4.7 CM
BH CV ECHO MEAS - LVIDS: 2.9 CM
BH CV ECHO MEAS - LVOT AREA (M): 2.3 CM^2
BH CV ECHO MEAS - LVOT AREA: 2.3 CM^2
BH CV ECHO MEAS - LVOT DIAM: 1.7 CM
BH CV ECHO MEAS - LVPWD: 1.2 CM
BH CV ECHO MEAS - MV A DUR: 0.11 SEC
BH CV ECHO MEAS - MV A MAX VEL: 89.7 CM/SEC
BH CV ECHO MEAS - MV DEC SLOPE: 475 CM/SEC^2
BH CV ECHO MEAS - MV DEC TIME: 0.2 SEC
BH CV ECHO MEAS - MV E MAX VEL: 77.1 CM/SEC
BH CV ECHO MEAS - MV E/A: 0.86
BH CV ECHO MEAS - MV MAX PG: 2.6 MMHG
BH CV ECHO MEAS - MV MEAN PG: 1 MMHG
BH CV ECHO MEAS - MV P1/2T MAX VEL: 82.7 CM/SEC
BH CV ECHO MEAS - MV P1/2T: 51 MSEC
BH CV ECHO MEAS - MV V2 MAX: 81.3 CM/SEC
BH CV ECHO MEAS - MV V2 MEAN: 49 CM/SEC
BH CV ECHO MEAS - MV V2 VTI: 15.3 CM
BH CV ECHO MEAS - MVA P1/2T LCG: 2.7 CM^2
BH CV ECHO MEAS - MVA(P1/2T): 4.3 CM^2
BH CV ECHO MEAS - MVA(VTI): 4.1 CM^2
BH CV ECHO MEAS - PA ACC TIME: 0.08 SEC
BH CV ECHO MEAS - PA MAX PG (FULL): 2.3 MMHG
BH CV ECHO MEAS - PA MAX PG: 4.2 MMHG
BH CV ECHO MEAS - PA PR(ACCEL): 44.4 MMHG
BH CV ECHO MEAS - PA V2 MAX: 102 CM/SEC
BH CV ECHO MEAS - PVA(V,A): 1.5 CM^2
BH CV ECHO MEAS - PVA(V,D): 1.5 CM^2
BH CV ECHO MEAS - QP/QS: 0.52
BH CV ECHO MEAS - RAP SYSTOLE: 3 MMHG
BH CV ECHO MEAS - RV MAX PG: 1.9 MMHG
BH CV ECHO MEAS - RV MEAN PG: 1 MMHG
BH CV ECHO MEAS - RV V1 MAX: 68.7 CM/SEC
BH CV ECHO MEAS - RV V1 MEAN: 53.3 CM/SEC
BH CV ECHO MEAS - RV V1 VTI: 14.2 CM
BH CV ECHO MEAS - RVOT AREA: 2.3 CM^2
BH CV ECHO MEAS - RVOT DIAM: 1.7 CM
BH CV ECHO MEAS - RVSP: 22.9 MMHG
BH CV ECHO MEAS - SI(AO): 102.4 ML/M^2
BH CV ECHO MEAS - SI(CUBED): 39.2 ML/M^2
BH CV ECHO MEAS - SI(LVOT): 30.8 ML/M^2
BH CV ECHO MEAS - SI(TEICH): 34.6 ML/M^2
BH CV ECHO MEAS - SV(AO): 207.5 ML
BH CV ECHO MEAS - SV(CUBED): 79.4 ML
BH CV ECHO MEAS - SV(LVOT): 62.4 ML
BH CV ECHO MEAS - SV(RVOT): 32.2 ML
BH CV ECHO MEAS - SV(TEICH): 70.1 ML
BH CV ECHO MEAS - TR MAX VEL: 223 CM/SEC
BH CV VAS BP RIGHT ARM: NORMAL MMHG
BH CV VAS HEPATIC PORTAL VEIN DIAMETER: 0.85 CM
BH CV VAS SMA HEPATIC EDV: 52 CM/S
BH CV VAS SMA HEPATIC PSV: 124 CM/S
BH CV VAS SMA SPLENIC EDV: 56 CM/S
BH CV VAS SMA SPLENIC PSV: 109 CM/S
BILIRUB SERPL-MCNC: 1 MG/DL (ref 0.1–1.2)
BUN BLD-MCNC: 12 MG/DL (ref 6–20)
BUN/CREAT SERPL: 22.2 (ref 7–25)
CALCIUM SPEC-SCNC: 8.7 MG/DL (ref 8.6–10.5)
CHLORIDE SERPL-SCNC: 99 MMOL/L (ref 98–107)
CO2 SERPL-SCNC: 26 MMOL/L (ref 22–29)
CREAT BLD-MCNC: 0.54 MG/DL (ref 0.57–1)
DEPRECATED RDW RBC AUTO: 40.8 FL (ref 37–54)
EOSINOPHIL # BLD AUTO: 0.21 10*3/MM3 (ref 0–0.7)
EOSINOPHIL NFR BLD AUTO: 1.7 % (ref 0.3–6.2)
ERYTHROCYTE [DISTWIDTH] IN BLOOD BY AUTOMATED COUNT: 12.3 % (ref 11.7–13)
GFR SERPL CREATININE-BSD FRML MDRD: 122 ML/MIN/1.73
GLOBULIN UR ELPH-MCNC: 3.8 GM/DL
GLUCOSE BLD-MCNC: 113 MG/DL (ref 65–99)
HCT VFR BLD AUTO: 29 % (ref 35.6–45.5)
HGB BLD-MCNC: 9.9 G/DL (ref 11.9–15.5)
IMM GRANULOCYTES # BLD AUTO: 0.08 10*3/MM3 (ref 0–0.03)
IMM GRANULOCYTES NFR BLD AUTO: 0.6 % (ref 0–0.5)
INR PPP: 1.24 (ref 0.9–1.1)
LYMPHOCYTES # BLD AUTO: 0.81 10*3/MM3 (ref 0.9–4.8)
LYMPHOCYTES NFR BLD AUTO: 6.4 % (ref 19.6–45.3)
MAXIMAL PREDICTED HEART RATE: 174 BPM
MCH RBC QN AUTO: 30.7 PG (ref 26.9–32)
MCHC RBC AUTO-ENTMCNC: 34.1 G/DL (ref 32.4–36.3)
MCV RBC AUTO: 90.1 FL (ref 80.5–98.2)
MONOCYTES # BLD AUTO: 1.6 10*3/MM3 (ref 0.2–1.2)
MONOCYTES NFR BLD AUTO: 12.7 % (ref 5–12)
NEUTROPHILS # BLD AUTO: 9.95 10*3/MM3 (ref 1.9–8.1)
NEUTROPHILS NFR BLD AUTO: 79 % (ref 42.7–76)
PLATELET # BLD AUTO: 322 10*3/MM3 (ref 140–500)
PMV BLD AUTO: 10.3 FL (ref 6–12)
POTASSIUM BLD-SCNC: 3.5 MMOL/L (ref 3.5–5.2)
PROT SERPL-MCNC: 6.6 G/DL (ref 6–8.5)
PROTHROMBIN TIME: 15.3 SECONDS (ref 11.7–14.2)
RBC # BLD AUTO: 3.22 10*6/MM3 (ref 3.9–5.2)
SODIUM BLD-SCNC: 136 MMOL/L (ref 136–145)
STRESS TARGET HR: 148 BPM
WBC NRBC COR # BLD: 12.59 10*3/MM3 (ref 4.5–10.7)

## 2019-01-06 PROCEDURE — 85730 THROMBOPLASTIN TIME PARTIAL: CPT | Performed by: HOSPITALIST

## 2019-01-06 PROCEDURE — 85025 COMPLETE CBC W/AUTO DIFF WBC: CPT | Performed by: HOSPITALIST

## 2019-01-06 PROCEDURE — 80053 COMPREHEN METABOLIC PANEL: CPT | Performed by: HOSPITALIST

## 2019-01-06 PROCEDURE — 85610 PROTHROMBIN TIME: CPT | Performed by: INTERNAL MEDICINE

## 2019-01-06 PROCEDURE — 93975 VASCULAR STUDY: CPT

## 2019-01-06 PROCEDURE — 25010000002 HEPARIN (PORCINE) PER 1000 UNITS: Performed by: HOSPITALIST

## 2019-01-06 PROCEDURE — 25010000002 HYDROMORPHONE PER 4 MG: Performed by: HOSPITALIST

## 2019-01-06 PROCEDURE — 99232 SBSQ HOSP IP/OBS MODERATE 35: CPT | Performed by: INTERNAL MEDICINE

## 2019-01-06 RX ORDER — BUPROPION HYDROCHLORIDE 300 MG/1
300 TABLET ORAL NIGHTLY
Status: DISCONTINUED | OUTPATIENT
Start: 2019-01-06 | End: 2019-01-09 | Stop reason: HOSPADM

## 2019-01-06 RX ORDER — VENLAFAXINE 37.5 MG/1
37.5 TABLET ORAL NIGHTLY
Status: DISCONTINUED | OUTPATIENT
Start: 2019-01-06 | End: 2019-01-09 | Stop reason: HOSPADM

## 2019-01-06 RX ORDER — METHOCARBAMOL 750 MG/1
750 TABLET, FILM COATED ORAL 3 TIMES DAILY PRN
Status: DISCONTINUED | OUTPATIENT
Start: 2019-01-06 | End: 2019-01-09 | Stop reason: HOSPADM

## 2019-01-06 RX ADMIN — OXYCODONE HYDROCHLORIDE AND ACETAMINOPHEN 1 TABLET: 7.5; 325 TABLET ORAL at 18:57

## 2019-01-06 RX ADMIN — HYDROMORPHONE HYDROCHLORIDE 0.5 MG: 1 INJECTION, SOLUTION INTRAMUSCULAR; INTRAVENOUS; SUBCUTANEOUS at 06:38

## 2019-01-06 RX ADMIN — DOCUSATE SODIUM 100 MG: 100 CAPSULE, LIQUID FILLED ORAL at 22:11

## 2019-01-06 RX ADMIN — SODIUM CHLORIDE, PRESERVATIVE FREE 3 ML: 5 INJECTION INTRAVENOUS at 22:12

## 2019-01-06 RX ADMIN — VENLAFAXINE 37.5 MG: 37.5 TABLET ORAL at 22:11

## 2019-01-06 RX ADMIN — HYDROMORPHONE HYDROCHLORIDE 0.5 MG: 1 INJECTION, SOLUTION INTRAMUSCULAR; INTRAVENOUS; SUBCUTANEOUS at 08:49

## 2019-01-06 RX ADMIN — OXYCODONE HYDROCHLORIDE AND ACETAMINOPHEN 1 TABLET: 7.5; 325 TABLET ORAL at 23:02

## 2019-01-06 RX ADMIN — HEPARIN SODIUM 17.8 UNITS/KG/HR: 10000 INJECTION, SOLUTION INTRAVENOUS at 04:16

## 2019-01-06 RX ADMIN — VENLAFAXINE 37.5 MG: 37.5 TABLET ORAL at 08:45

## 2019-01-06 RX ADMIN — BUPROPION HYDROCHLORIDE 300 MG: 300 TABLET, EXTENDED RELEASE ORAL at 08:45

## 2019-01-06 RX ADMIN — APIXABAN 10 MG: 5 TABLET, FILM COATED ORAL at 22:11

## 2019-01-06 RX ADMIN — DOCUSATE SODIUM 100 MG: 100 CAPSULE, LIQUID FILLED ORAL at 08:45

## 2019-01-06 RX ADMIN — OXYCODONE HYDROCHLORIDE AND ACETAMINOPHEN 1 TABLET: 7.5; 325 TABLET ORAL at 12:11

## 2019-01-06 RX ADMIN — HYDROMORPHONE HYDROCHLORIDE 0.5 MG: 1 INJECTION, SOLUTION INTRAMUSCULAR; INTRAVENOUS; SUBCUTANEOUS at 16:43

## 2019-01-06 RX ADMIN — SODIUM CHLORIDE, PRESERVATIVE FREE 3 ML: 5 INJECTION INTRAVENOUS at 09:04

## 2019-01-06 RX ADMIN — BUPROPION HYDROCHLORIDE 300 MG: 300 TABLET, EXTENDED RELEASE ORAL at 22:11

## 2019-01-06 RX ADMIN — HEPARIN SODIUM 17.8 UNITS/KG/HR: 10000 INJECTION, SOLUTION INTRAVENOUS at 18:49

## 2019-01-06 NOTE — PROGRESS NOTES
[facility]  Inpatient Progress Note    Reason for Follow Up:  Elevated LFTs    Subjective     Interval History:   She feels better today.  No chest pain with breathing.  Less difficulty breathing.  No abd pain, n/v    Current Facility-Administered Medications:   •  bisacodyl (DULCOLAX) EC tablet 5 mg, 5 mg, Oral, Daily PRN, Joshua Magana MD  •  buPROPion XL (WELLBUTRIN XL) 24 hr tablet 300 mg, 300 mg, Oral, Daily, Joshua Magana MD, 300 mg at 01/06/19 0845  •  docusate sodium (COLACE) capsule 100 mg, 100 mg, Oral, BID, Joshua Magana MD, 100 mg at 01/06/19 0845  •  heparin 83828 units/250 mL (100 units/mL) in 0.45 % NaCl infusion, 17.8 Units/kg/hr, Intravenous, Titrated, Joshua Magana MD, Last Rate: 14.98 mL/hr at 01/06/19 0416, 17.8 Units/kg/hr at 01/06/19 0416  •  HYDROmorphone (DILAUDID) injection 0.5 mg, 0.5 mg, Intravenous, Q1H PRN, Joshua Magana MD, 0.5 mg at 01/06/19 0849  •  methocarbamol (ROBAXIN) tablet 750 mg, 750 mg, Oral, 4x Daily, Joshua Magana MD, 750 mg at 01/05/19 1207  •  ondansetron (ZOFRAN) injection 4 mg, 4 mg, Intravenous, Q6H PRN, Joshua Magana MD  •  ondansetron (ZOFRAN) tablet 4 mg, 4 mg, Oral, Q6H PRN **OR** ondansetron ODT (ZOFRAN-ODT) disintegrating tablet 4 mg, 4 mg, Oral, Q6H PRN **OR** ondansetron (ZOFRAN) injection 4 mg, 4 mg, Intravenous, Q6H PRN, Joshua Magana MD  •  ondansetron (ZOFRAN) tablet 4 mg, 4 mg, Oral, Q8H PRN, Joshua Magana MD  •  oxyCODONE-acetaminophen (PERCOCET) 7.5-325 MG per tablet 1 tablet, 1 tablet, Oral, Q6H PRN, Joshua Magana MD, 1 tablet at 01/05/19 1207  •  oxyCODONE-acetaminophen (PERCOCET) 7.5-325 MG per tablet 1 tablet, 1 tablet, Oral, Q4H PRN, Joshua Magana MD  •  sodium chloride 0.9 % flush 3 mL, 3 mL, Intravenous, Q12H, Joshua Magana MD, 3 mL at 01/06/19 0904  •  sodium chloride 0.9 % flush 3-10 mL, 3-10 mL, Intravenous, PRN, Joshua Magana MD  •  SUMAtriptan (IMITREX) tablet 100 mg, 100 mg, Oral, Q2H PRN, Beard, Joshua E,  MD  •  traZODone (DESYREL) tablet 50 mg, 50 mg, Oral, Nightly, Joshua Magana MD  •  venlafaxine (EFFEXOR) tablet 37.5 mg, 37.5 mg, Oral, Daily, Joshua Magana MD, 37.5 mg at 01/06/19 0845  Review of Systems:    The following systems were reviewed and negative;  cardiovascular and gastrointestinal    Objective     Vital Signs  Temp:  [98.7 °F (37.1 °C)-99.8 °F (37.7 °C)] 98.7 °F (37.1 °C)  Heart Rate:  [] 108  Resp:  [16-20] 20  BP: (115-131)/(64-76) 122/75  Body mass index is 29.8 kg/m².    Intake/Output Summary (Last 24 hours) at 1/6/2019 1009  Last data filed at 1/6/2019 0900  Gross per 24 hour   Intake 927.08 ml   Output 1020 ml   Net -92.92 ml     I/O this shift:  In: 51 [I.V.:51]  Out: 700 [Urine:700]     Physical Exam:   General: patient awake, alert and cooperative   Eyes: Normal lids and lashes, no scleral icterus   Neck: supple, normal ROM   Skin: warm and dry, not jaundiced   Abdomen: soft, nontender, nondistended   Psychiatric: Normal mood and behavior; memory intact     Results Review:     I reviewed the patient's new clinical results.    Results from last 7 days   Lab Units  01/06/19   0527  01/05/19   1006   WBC 10*3/mm3  12.59*  13.32*   HEMOGLOBIN g/dL  9.9*  10.6*   HEMATOCRIT %  29.0*  31.1*   PLATELETS 10*3/mm3  322  294     Results from last 7 days   Lab Units  01/06/19   0527  01/05/19   1409  01/05/19   1006   SODIUM mmol/L  136  139  139   POTASSIUM mmol/L  3.5  3.6  3.9   CHLORIDE mmol/L  99  100  100   CO2 mmol/L  26.0  27.1  28.6   BUN mg/dL  12  13  11   CREATININE mg/dL  0.54*  0.72  0.64   CALCIUM mg/dL  8.7  8.8  8.6   BILIRUBIN mg/dL  1.0  1.1  1.0   ALK PHOS U/L  411*  439*  267*   ALT (SGPT) U/L  569*  785*  332*   AST (SGOT) U/L  413*  1,152*  656*   GLUCOSE mg/dL  113*  103*  105*     Results from last 7 days   Lab Units  01/06/19   0527  01/05/19   1006   INR   1.24*  1.33*     Lab Results   Lab Value Date/Time    LIPASE 33 03/12/2015 1405       Radiology:  CT Abdomen  Pelvis With & Without Contrast   Final Result   IMPRESSION :    1. Pulmonary findings as discussed. These will require follow-up.   2. Constipation without obstruction.   3. Right adnexal cyst, outpatient ultrasound follow-up recommended to   ensure resolution.                            This report was finalized on 1/6/2019 4:55 AM by Min Flowers M.D.          US Gallbladder   Final Result   1. Unremarkable right upper quadrant ultrasound.       This report was finalized on 1/5/2019 7:11 PM by Min Flowers M.D.              Assessment/Plan     Patient Active Problem List   Diagnosis   • Atopic dermatitis   • Carpal tunnel syndrome   • Dyslipidemia   • Fatigue   • Cervical pain   • Vitamin D deficiency   • Depression   • Pulmonary emboli (CMS/HCC)   • Acute deep vein thrombosis (DVT) of distal vein of left lower extremity (CMS/HCC)   • Elevated liver function tests     Assessment-  1.  Acute elevation in LFTs  2.  Acute pulmonary embolism-likely secondary to recent surgery, no prior thrombosis     Plan-  - Blood pressures been stable-continue to follow  - No thrombus seen on CT, u/s normal.  Nml tylenol level as expected  - Lfts trending down - synthetic function intact  - Check portal hepatic doppler study today  - continue to follow lfts        I discussed the patients findings and my recommendations with patient, family and nursing staff.    Susy Larios MD

## 2019-01-06 NOTE — PLAN OF CARE
Problem: Patient Care Overview  Goal: Plan of Care Review  Outcome: Ongoing (interventions implemented as appropriate)   01/06/19 5023   Coping/Psychosocial   Plan of Care Reviewed With patient   Plan of Care Review   Progress no change   OTHER   Outcome Summary heparin drip continued, will check aptt daily now. pain controlled alternating IV dilaudid and PO. minimal activity today. dressing changed on LOU site. VSS. oxygen on 2L. hepatic vein doppler completed today. will monitor closely.       Problem: Fall Risk (Adult)  Goal: Absence of Fall  Outcome: Ongoing (interventions implemented as appropriate)      Problem: VTE, DVT and PE (Adult)  Goal: Signs and Symptoms of Listed Potential Problems Will be Absent, Minimized or Managed (VTE, DVT and PE)  Outcome: Ongoing (interventions implemented as appropriate)      Problem: Skin Injury Risk (Adult)  Goal: Skin Health and Integrity  Outcome: Ongoing (interventions implemented as appropriate)

## 2019-01-06 NOTE — PLAN OF CARE
Problem: Patient Care Overview  Goal: Plan of Care Review  Outcome: Ongoing (interventions implemented as appropriate)   01/06/19 0545   Coping/Psychosocial   Plan of Care Reviewed With patient   Plan of Care Review   Progress improving   OTHER   Outcome Summary VSS, pt resting well all evening, IV pain medication earlier in shift - no c/o pain since at regular pain assessments. O2 @2L. CT of Abd/Pelv & Acet labs unremarkable. Heparin gtt infusing - awaiting AM lab results. LLQ LOU drain in place from prior surgery.  at bedside. Will CTM.        Problem: Fall Risk (Adult)  Goal: Identify Related Risk Factors and Signs and Symptoms  Outcome: Ongoing (interventions implemented as appropriate)      Problem: VTE, DVT and PE (Adult)  Goal: Signs and Symptoms of Listed Potential Problems Will be Absent, Minimized or Managed (VTE, DVT and PE)  Outcome: Ongoing (interventions implemented as appropriate)

## 2019-01-06 NOTE — PROGRESS NOTES
"DAILY PROGRESS NOTE  [facility]    Patient Identification:  Name: Merari Bowen  Age: 44 y.o.  Sex: female  :  1975  MRN: [id number]         Primary Care Physician: Chandan Rubio DO    Subjective:  Interval History:She complains of pain and short of air. On 2 L O 2.    Objective:    Scheduled Meds:  buPROPion  mg Oral Daily   docusate sodium 100 mg Oral BID   methocarbamol 750 mg Oral 4x Daily   sodium chloride 3 mL Intravenous Q12H   traZODone 50 mg Oral Nightly   venlafaxine 37.5 mg Oral Daily     Continuous Infusions:  heparin (porcine) 17.8 Units/kg/hr Last Rate: 17.8 Units/kg/hr (19 0416)       Vital signs in last 24 hours:  Temp:  [98.5 °F (36.9 °C)-99.8 °F (37.7 °C)] 98.5 °F (36.9 °C)  Heart Rate:  [] 93  Resp:  [18-20] 20  BP: (115-131)/(64-76) 118/75    Intake/Output:    Intake/Output Summary (Last 24 hours) at 2019 1614  Last data filed at 2019 0919  Gross per 24 hour   Intake 447.08 ml   Output 1020 ml   Net -572.92 ml       Exam:  /75 (BP Location: Left arm, Patient Position: Lying)   Pulse 93   Temp 98.5 °F (36.9 °C) (Oral)   Resp 20   Ht 172.7 cm (68\")   Wt 88.9 kg (196 lb)   LMP  (Within Years) Comment: 6 years  SpO2 95%   BMI 29.80 kg/m²     General Appearance:    Alert, cooperative, no distress   Head:    Normocephalic, without obvious abnormality, atraumatic   Eyes:       Throat:   Lips, tongue, gums normal   Neck:   Supple, symmetrical, trachea midline, no JVD   Lungs:     Clear to auscultation bilaterally, respirations unlabored   Chest Wall:    No tenderness or deformity    Heart:    Regular rate and rhythm, S1 and S2 normal, no murmur,no  Rub or gallop   Abdomen:     Soft, non-tender, bowel sounds active, no masses, no organomegaly    Extremities:   Extremities normal, atraumatic, no cyanosis or edema   Pulses:      Skin:   Skin is warm and dry,  no rashes or palpable lesions   Neurologic:   no focal deficits noted      Lab " Results (last 72 hours)     Procedure Component Value Units Date/Time    Comprehensive Metabolic Panel [[id number]]  (Abnormal) Collected:  01/06/19 0527    Specimen:  Blood Updated:  01/06/19 0627     Glucose 113 mg/dL      BUN 12 mg/dL      Creatinine 0.54 mg/dL      Sodium 136 mmol/L      Potassium 3.5 mmol/L      Chloride 99 mmol/L      CO2 26.0 mmol/L      Calcium 8.7 mg/dL      Total Protein 6.6 g/dL      Albumin 2.80 g/dL      ALT (SGPT) 569 U/L      AST (SGOT) 413 U/L      Alkaline Phosphatase 411 U/L      Total Bilirubin 1.0 mg/dL      eGFR Non African Amer 122 mL/min/1.73      Globulin 3.8 gm/dL      A/G Ratio 0.7 g/dL      BUN/Creatinine Ratio 22.2     Anion Gap 11.0 mmol/L     aPTT [[id number]]  (Abnormal) Collected:  01/06/19 0527    Specimen:  Blood Updated:  01/06/19 0607     PTT 90.2 seconds     Protime-INR [[id number]]  (Abnormal) Collected:  01/06/19 0527    Specimen:  Blood Updated:  01/06/19 0607     Protime 15.3 Seconds      INR 1.24    CBC & Differential [[id number]] Collected:  01/06/19 0527    Specimen:  Blood Updated:  01/06/19 0602    Narrative:       The following orders were created for panel order CBC & Differential.  Procedure                               Abnormality         Status                     ---------                               -----------         ------                     CBC Auto Differential[[id number]]        Abnormal            Final result                 Please view results for these tests on the individual orders.    CBC Auto Differential [[id number]]  (Abnormal) Collected:  01/06/19 0527    Specimen:  Blood Updated:  01/06/19 0602     WBC 12.59 10*3/mm3      RBC 3.22 10*6/mm3      Hemoglobin 9.9 g/dL      Hematocrit 29.0 %      MCV 90.1 fL      MCH 30.7 pg      MCHC 34.1 g/dL      RDW 12.3 %      RDW-SD 40.8 fl      MPV 10.3 fL      Platelets 322 10*3/mm3      Neutrophil % 79.0 %      Lymphocyte % 6.4 %      Monocyte % 12.7 %      Eosinophil % 1.7 %      Basophil % 0.2  %      Immature Grans % 0.6 %      Neutrophils, Absolute 9.95 10*3/mm3      Lymphocytes, Absolute 0.81 10*3/mm3      Monocytes, Absolute 1.60 10*3/mm3      Eosinophils, Absolute 0.21 10*3/mm3      Basophils, Absolute 0.02 10*3/mm3      Immature Grans, Absolute 0.08 10*3/mm3     Acetaminophen Level [[id number]]  (Abnormal) Collected:  01/05/19 1744    Specimen:  Blood Updated:  01/05/19 1940     Acetaminophen <5.0 mcg/mL     BNP [[id number]]  (Normal) Collected:  01/05/19 1409    Specimen:  Blood Updated:  01/05/19 1937     proBNP 33.5 pg/mL     Narrative:       Among patients with dyspnea, NT-proBNP is highly sensitive for the detection of acute congestive heart failure. In addition NT-proBNP of <300 pg/ml effectively rules out acute congestive heart failure with 99% negative predictive value.    Hepatitis Panel, Acute [[id number]]  (Normal) Collected:  01/05/19 1744    Specimen:  Blood Updated:  01/05/19 1915     Hepatitis B Surface Ag Non-Reactive     Hep A IgM Non-Reactive     Hep B C IgM Non-Reactive     Hepatitis C Ab Non-Reactive    aPTT [[id number]]  (Abnormal) Collected:  01/05/19 1744    Specimen:  Blood from Arm, Right Updated:  01/05/19 1820     PTT 83.6 seconds     Comprehensive Metabolic Panel [[id number]]  (Abnormal) Collected:  01/05/19 1409    Specimen:  Blood Updated:  01/05/19 1511     Glucose 103 mg/dL      BUN 13 mg/dL      Creatinine 0.72 mg/dL      Sodium 139 mmol/L      Potassium 3.6 mmol/L      Chloride 100 mmol/L      CO2 27.1 mmol/L      Calcium 8.8 mg/dL      Total Protein 6.8 g/dL      Albumin 3.00 g/dL      ALT (SGPT) 785 U/L      AST (SGOT) 1,152 U/L      Alkaline Phosphatase 439 U/L      Total Bilirubin 1.1 mg/dL      eGFR Non African Amer 87 mL/min/1.73      Globulin 3.8 gm/dL      A/G Ratio 0.8 g/dL      BUN/Creatinine Ratio 18.1     Anion Gap 11.9 mmol/L     aPTT [[id number]]  (Abnormal) Collected:  01/05/19 1409    Specimen:  Blood from Arm, Right Updated:  01/05/19 1448     PTT 96.6  seconds     CBC & Differential [[id number]] Collected:  01/05/19 1006    Specimen:  Blood Updated:  01/05/19 1122    Narrative:       The following orders were created for panel order CBC & Differential.  Procedure                               Abnormality         Status                     ---------                               -----------         ------                     CBC Auto Differential[[id number]]        Abnormal            Final result                 Please view results for these tests on the individual orders.    CBC Auto Differential [[id number]]  (Abnormal) Collected:  01/05/19 1006    Specimen:  Blood Updated:  01/05/19 1122     WBC 13.32 10*3/mm3      RBC 3.40 10*6/mm3      Hemoglobin 10.6 g/dL      Hematocrit 31.1 %      MCV 91.5 fL      MCH 31.2 pg      MCHC 34.1 g/dL      RDW 12.5 %      RDW-SD 41.9 fl      MPV 10.4 fL      Platelets 294 10*3/mm3      Neutrophil % 80.4 %      Lymphocyte % 8.3 %      Monocyte % 9.7 %      Eosinophil % 1.4 %      Basophil % 0.2 %      Immature Grans % 0.9 %      Neutrophils, Absolute 10.70 10*3/mm3      Lymphocytes, Absolute 1.11 10*3/mm3      Monocytes, Absolute 1.29 10*3/mm3      Eosinophils, Absolute 0.19 10*3/mm3      Basophils, Absolute 0.03 10*3/mm3      Immature Grans, Absolute 0.12 10*3/mm3     Comprehensive Metabolic Panel [[id number]]  (Abnormal) Collected:  01/05/19 1006    Specimen:  Blood Updated:  01/05/19 1046     Glucose 105 mg/dL      BUN 11 mg/dL      Creatinine 0.64 mg/dL      Sodium 139 mmol/L      Potassium 3.9 mmol/L      Chloride 100 mmol/L      CO2 28.6 mmol/L      Calcium 8.6 mg/dL      Total Protein 6.6 g/dL      Albumin 3.00 g/dL      ALT (SGPT) 332 U/L      AST (SGOT) 656 U/L      Alkaline Phosphatase 267 U/L      Total Bilirubin 1.0 mg/dL      eGFR Non African Amer 100 mL/min/1.73      Globulin 3.6 gm/dL      A/G Ratio 0.8 g/dL      BUN/Creatinine Ratio 17.2     Anion Gap 10.4 mmol/L     Protime-INR [[id number]]  (Abnormal) Collected:   01/05/19 1006    Specimen:  Blood Updated:  01/05/19 1039     Protime 16.2 Seconds      INR 1.33    aPTT [[id number]]  (Abnormal) Collected:  01/05/19 1006    Specimen:  Blood Updated:  01/05/19 1039     PTT 94.5 seconds         Data Review:  Results from last 7 days   Lab Units  01/06/19 0527 01/05/19   1409  01/05/19   1006   SODIUM mmol/L  136  139  139   POTASSIUM mmol/L  3.5  3.6  3.9   CHLORIDE mmol/L  99  100  100   CO2 mmol/L  26.0  27.1  28.6   BUN mg/dL  12  13  11   CREATININE mg/dL  0.54*  0.72  0.64   GLUCOSE mg/dL  113*  103*  105*   CALCIUM mg/dL  8.7  8.8  8.6     Results from last 7 days   Lab Units  01/06/19   0527 01/05/19   1006   WBC 10*3/mm3  12.59*  13.32*   HEMOGLOBIN g/dL  9.9*  10.6*   HEMATOCRIT %  29.0*  31.1*   PLATELETS 10*3/mm3  322  294             No results found for: TROPONINT      Results from last 7 days   Lab Units  01/06/19   0527 01/05/19   1409  01/05/19   1006   ALK PHOS U/L  411*  439*  267*   BILIRUBIN mg/dL  1.0  1.1  1.0   ALT (SGPT) U/L  569*  785*  332*   AST (SGOT) U/L  413*  1,152*  656*             No results found for: POCGLU  Results from last 7 days   Lab Units  01/06/19 0527 01/05/19   1006   INR   1.24*  1.33*       Past Medical History:   Diagnosis Date   • Asthma     exercise induced       Assessment:  Active Hospital Problems    Diagnosis Date Noted   • **Pulmonary emboli (CMS/HCC) [I26.99] 01/05/2019   • Acute deep vein thrombosis (DVT) of distal vein of left lower extremity (CMS/HCC) [I82.4Z2] 01/05/2019   • Elevated liver function tests [R94.5] 01/05/2019   • Depression [F32.9] 01/03/2017   • Dyslipidemia [E78.5] 03/09/2016      Resolved Hospital Problems   No resolved problems to display.       Plan:  Continue anticoagulation with heparin. Encourage incentive spirometry. Consults from pulmonary and GI noted. Follow up labs. Eventually transition to oral anticoagulants. ECHO poor study but no pulmonary HTN.    Joshua Magana MD  1/6/2019  4:14  PM

## 2019-01-07 PROBLEM — R09.02 HYPOXIA: Status: ACTIVE | Noted: 2019-01-07

## 2019-01-07 LAB
ALBUMIN SERPL-MCNC: 2.6 G/DL (ref 3.5–5.2)
ALBUMIN/GLOB SERPL: 0.7 G/DL
ALP SERPL-CCNC: 337 U/L (ref 39–117)
ALT SERPL W P-5'-P-CCNC: 321 U/L (ref 1–33)
ANION GAP SERPL CALCULATED.3IONS-SCNC: 11 MMOL/L
AST SERPL-CCNC: 97 U/L (ref 1–32)
BILIRUB SERPL-MCNC: 0.6 MG/DL (ref 0.1–1.2)
BUN BLD-MCNC: 12 MG/DL (ref 6–20)
BUN/CREAT SERPL: 20 (ref 7–25)
CALCIUM SPEC-SCNC: 9 MG/DL (ref 8.6–10.5)
CHLORIDE SERPL-SCNC: 99 MMOL/L (ref 98–107)
CO2 SERPL-SCNC: 28 MMOL/L (ref 22–29)
CREAT BLD-MCNC: 0.6 MG/DL (ref 0.57–1)
GFR SERPL CREATININE-BSD FRML MDRD: 108 ML/MIN/1.73
GLOBULIN UR ELPH-MCNC: 4 GM/DL
GLUCOSE BLD-MCNC: 106 MG/DL (ref 65–99)
INR PPP: 1.45 (ref 0.9–1.1)
POTASSIUM BLD-SCNC: 3.4 MMOL/L (ref 3.5–5.2)
PROT SERPL-MCNC: 6.6 G/DL (ref 6–8.5)
PROTHROMBIN TIME: 17.4 SECONDS (ref 11.7–14.2)
SODIUM BLD-SCNC: 138 MMOL/L (ref 136–145)

## 2019-01-07 PROCEDURE — 80053 COMPREHEN METABOLIC PANEL: CPT | Performed by: INTERNAL MEDICINE

## 2019-01-07 PROCEDURE — 94640 AIRWAY INHALATION TREATMENT: CPT

## 2019-01-07 PROCEDURE — 94618 PULMONARY STRESS TESTING: CPT

## 2019-01-07 PROCEDURE — 25010000002 FUROSEMIDE PER 20 MG: Performed by: HOSPITALIST

## 2019-01-07 PROCEDURE — 85610 PROTHROMBIN TIME: CPT | Performed by: INTERNAL MEDICINE

## 2019-01-07 PROCEDURE — 94799 UNLISTED PULMONARY SVC/PX: CPT

## 2019-01-07 PROCEDURE — 99232 SBSQ HOSP IP/OBS MODERATE 35: CPT | Performed by: INTERNAL MEDICINE

## 2019-01-07 RX ORDER — POLYETHYLENE GLYCOL 3350 17 G/17G
17 POWDER, FOR SOLUTION ORAL 2 TIMES DAILY
Status: DISCONTINUED | OUTPATIENT
Start: 2019-01-07 | End: 2019-01-09 | Stop reason: HOSPADM

## 2019-01-07 RX ORDER — SENNA AND DOCUSATE SODIUM 50; 8.6 MG/1; MG/1
2 TABLET, FILM COATED ORAL NIGHTLY
Status: DISCONTINUED | OUTPATIENT
Start: 2019-01-07 | End: 2019-01-07

## 2019-01-07 RX ORDER — POTASSIUM CHLORIDE 750 MG/1
40 CAPSULE, EXTENDED RELEASE ORAL EVERY 4 HOURS
Status: COMPLETED | OUTPATIENT
Start: 2019-01-07 | End: 2019-01-07

## 2019-01-07 RX ORDER — POLYETHYLENE GLYCOL 3350 17 G/17G
17 POWDER, FOR SOLUTION ORAL DAILY
Status: DISCONTINUED | OUTPATIENT
Start: 2019-01-07 | End: 2019-01-07

## 2019-01-07 RX ORDER — FUROSEMIDE 10 MG/ML
20 INJECTION INTRAMUSCULAR; INTRAVENOUS ONCE
Status: DISCONTINUED | OUTPATIENT
Start: 2019-01-07 | End: 2019-01-07

## 2019-01-07 RX ORDER — FUROSEMIDE 10 MG/ML
40 INJECTION INTRAMUSCULAR; INTRAVENOUS ONCE
Status: COMPLETED | OUTPATIENT
Start: 2019-01-07 | End: 2019-01-07

## 2019-01-07 RX ORDER — BUDESONIDE 1 MG/2ML
1 INHALANT ORAL
Status: DISCONTINUED | OUTPATIENT
Start: 2019-01-07 | End: 2019-01-09 | Stop reason: HOSPADM

## 2019-01-07 RX ORDER — SENNA AND DOCUSATE SODIUM 50; 8.6 MG/1; MG/1
2 TABLET, FILM COATED ORAL 2 TIMES DAILY
Status: DISCONTINUED | OUTPATIENT
Start: 2019-01-07 | End: 2019-01-09 | Stop reason: HOSPADM

## 2019-01-07 RX ORDER — IPRATROPIUM BROMIDE AND ALBUTEROL SULFATE 2.5; .5 MG/3ML; MG/3ML
3 SOLUTION RESPIRATORY (INHALATION)
Status: DISCONTINUED | OUTPATIENT
Start: 2019-01-07 | End: 2019-01-09 | Stop reason: HOSPADM

## 2019-01-07 RX ORDER — SENNOSIDES 8.6 MG
2 TABLET ORAL NIGHTLY
Status: DISCONTINUED | OUTPATIENT
Start: 2019-01-07 | End: 2019-01-07

## 2019-01-07 RX ADMIN — SENNOSIDES AND DOCUSATE SODIUM 2 TABLET: 8.6; 5 TABLET ORAL at 22:39

## 2019-01-07 RX ADMIN — SUMATRIPTAN SUCCINATE 50 MG: 100 TABLET ORAL at 11:19

## 2019-01-07 RX ADMIN — DOCUSATE SODIUM 100 MG: 100 CAPSULE, LIQUID FILLED ORAL at 09:39

## 2019-01-07 RX ADMIN — SODIUM CHLORIDE, PRESERVATIVE FREE 3 ML: 5 INJECTION INTRAVENOUS at 09:39

## 2019-01-07 RX ADMIN — POTASSIUM CHLORIDE 40 MEQ: 750 CAPSULE, EXTENDED RELEASE ORAL at 18:00

## 2019-01-07 RX ADMIN — IPRATROPIUM BROMIDE AND ALBUTEROL SULFATE 3 ML: 2.5; .5 SOLUTION RESPIRATORY (INHALATION) at 20:06

## 2019-01-07 RX ADMIN — OXYCODONE HYDROCHLORIDE AND ACETAMINOPHEN 1 TABLET: 7.5; 325 TABLET ORAL at 11:11

## 2019-01-07 RX ADMIN — OXYCODONE HYDROCHLORIDE AND ACETAMINOPHEN 1 TABLET: 7.5; 325 TABLET ORAL at 06:56

## 2019-01-07 RX ADMIN — BUDESONIDE 1 MG: 1 SUSPENSION RESPIRATORY (INHALATION) at 20:06

## 2019-01-07 RX ADMIN — OXYCODONE HYDROCHLORIDE AND ACETAMINOPHEN 1 TABLET: 7.5; 325 TABLET ORAL at 19:04

## 2019-01-07 RX ADMIN — OXYCODONE HYDROCHLORIDE AND ACETAMINOPHEN 1 TABLET: 7.5; 325 TABLET ORAL at 03:10

## 2019-01-07 RX ADMIN — POTASSIUM CHLORIDE 40 MEQ: 750 CAPSULE, EXTENDED RELEASE ORAL at 22:38

## 2019-01-07 RX ADMIN — FUROSEMIDE 40 MG: 10 INJECTION, SOLUTION INTRAMUSCULAR; INTRAVENOUS at 18:00

## 2019-01-07 RX ADMIN — VENLAFAXINE 37.5 MG: 37.5 TABLET ORAL at 22:39

## 2019-01-07 RX ADMIN — APIXABAN 10 MG: 5 TABLET, FILM COATED ORAL at 09:39

## 2019-01-07 RX ADMIN — APIXABAN 10 MG: 5 TABLET, FILM COATED ORAL at 22:38

## 2019-01-07 RX ADMIN — BUPROPION HYDROCHLORIDE 150 MG: 300 TABLET, EXTENDED RELEASE ORAL at 22:39

## 2019-01-07 NOTE — PROGRESS NOTES
"                                              LOS: 1 day   Patient Care Team:  Chandan Rubio DO as PCP - General  Chandan Rubio DO as PCP - Family Medicine    Chief Complaint:  Follow-up on PE, dyspnea and pleuritic chest pain and medical problems listed below    Interval History:    she is on oxygen 2 L/m.  She stated that she gets short of breath on minimal activities such as when using the bathroom.  She has not had the incentive spirometry until now.  She stated that she gets about 500 ML only.  She cannot take a deep breath due to cough and pain.  She still have significant pain located mainly on the right side toward the back.  It's worse with deep breathing.  She had mild hemoptysis.    REVIEW OF SYSTEMS:      RESPIRATORY: Mild hemoptysis.  Dyspnea as above.  HEMATOLOGIC: No bleeding or bruising.     Ventilator/Non-Invasive Ventilation Settings (From admission, onward)    None            Vital Signs  Temp:  [98.5 °F (36.9 °C)-99.3 °F (37.4 °C)] 98.5 °F (36.9 °C)  Heart Rate:  [] 93  Resp:  [18-20] 20  BP: (115-131)/(67-76) 118/75    Intake/Output Summary (Last 24 hours) at 1/6/2019 1900  Last data filed at 1/6/2019 1700  Gross per 24 hour   Intake 682 ml   Output 720 ml   Net -38 ml     Flowsheet Rows      First Filed Value   Admission Height  172.7 cm (68\") Documented at 01/05/2019 0901   Admission Weight  84.2 kg (185 lb 10 oz) Documented at 01/05/2019 0901          Physical Exam:   General Appearance:    Alert, cooperative, in no acute distress   HEENT:  Mallampati score 3, moist mucous membrane   Neck:   Large. Trachea midline. No thyromegaly.   Lungs:     Very diminished air entry bilaterally due to poor inspiratory effort.  Bilateral basal crackles.  No wheezing     Heart:    Regular rhythm and normal rate, normal S1 and S2, no            murmur   Skin:    No abnormalities observed   Abdomen:     Obese. Soft. No tenderness. No HSM.   Neuro:   Conscious, alert, oriented x3   Extremities:  "  Moves all extremities well, no edema, no cyanosis, no             Redness          Results Review:        Results from last 7 days   Lab Units  01/06/19   0527  01/05/19   1409  01/05/19   1006   SODIUM mmol/L  136  139  139   POTASSIUM mmol/L  3.5  3.6  3.9   CHLORIDE mmol/L  99  100  100   CO2 mmol/L  26.0  27.1  28.6   BUN mg/dL  12  13  11   CREATININE mg/dL  0.54*  0.72  0.64   GLUCOSE mg/dL  113*  103*  105*   CALCIUM mg/dL  8.7  8.8  8.6         Results from last 7 days   Lab Units  01/06/19   0527  01/05/19   1006   WBC 10*3/mm3  12.59*  13.32*   HEMOGLOBIN g/dL  9.9*  10.6*   HEMATOCRIT %  29.0*  31.1*   PLATELETS 10*3/mm3  322  294     Results from last 7 days   Lab Units  01/06/19   0527  01/05/19   1744  01/05/19   1409  01/05/19   1006   INR   1.24*   --    --   1.33*   APTT seconds  90.2*  83.6*  96.6*  94.5*     Results from last 7 days   Lab Units  01/05/19   1409   PROBNP pg/mL  33.5       I reviewed the patient's new clinical results.  I personally viewed and interpreted the patient's CXR        Medication Review:     buPROPion  mg Oral Daily   docusate sodium 100 mg Oral BID   methocarbamol 750 mg Oral 4x Daily   sodium chloride 3 mL Intravenous Q12H   traZODone 50 mg Oral Nightly   venlafaxine 37.5 mg Oral Daily         heparin (porcine) 17.8 Units/kg/hr Last Rate: 17.8 Units/kg/hr (01/06/19 1849)     Reviewed the echocardiogram report dated 1/5/19.  Poor window.  No pulmonary hypertension    Assessment:     1. Acute right lower lobe segmental pulmonary embolism  2. Acute left lower extremity deep, gastrocnemius/soleal, likely provoked by recent surgery and immobilization   3. Pleuritic right sided chest/back pain, secondary to pulmonary infarct  4. right lower lobe pulmonary infarct  5. Bilateral lower lobe atelectasis  6. Mild hemoptysis, new, likely secondary to pulmonary infarct      other pertinent medical problems  6. Elevated transaminase  7. Asthma, mild  intermittent     Plan:    Encouraged to ambulate.  We'll place an order to allow patient to get out of the bed.  Encouraged to use incentive spirometry.    Stop heparin and start Eliquis.      Walking oximetry in a.m.    She can essentially be discharged home from my perspective     Reassured about hemoptysis.  I explained that her back pain might take a while to improve and there is no good correlation with the pulmonary embolism.  I also explained the natural progression of the PE which will mostly resolved with anticoagulation but patient would also need to increase her activities.  Her hypoxia is predominantly related to the atelectasis and pulmonary infarct rather than PE.  He is doing very poorly with incentive spirometry.    Spent more than 25 minutes with the patient and her family face-to-face today more than half of the time directed toward counseling and explanation of her prognosis and disease as stated above              Aníbal Reeder MD  01/06/19  7:00 PM          This note was dictated utilizing Lively dictation

## 2019-01-07 NOTE — PROGRESS NOTES
[facility], [facility]  Inpatient Progress Note    Reason for Follow Up:  Elevated LFTs    Subjective     Interval History:   Breathing ok, ate ok for breakfast.  No RUQ pain.  No BM in 10 days but endorses chronic constipation.      Current Facility-Administered Medications:   •  apixaban (ELIQUIS) tablet 10 mg, 10 mg, Oral, Q12H, 10 mg at 01/06/19 2211 **FOLLOWED BY** [START ON 1/13/2019] apixaban (ELIQUIS) tablet 5 mg, 5 mg, Oral, Q12H, Aníbal Reeder MD  •  bisacodyl (DULCOLAX) EC tablet 5 mg, 5 mg, Oral, Daily PRN, Joshua Magana MD  •  buPROPion XL (WELLBUTRIN XL) 24 hr tablet 300 mg, 300 mg, Oral, Nightly, Joshua Magana MD, 300 mg at 01/06/19 2211  •  docusate sodium (COLACE) capsule 100 mg, 100 mg, Oral, BID, Joshua Magana MD, 100 mg at 01/06/19 2211  •  HYDROmorphone (DILAUDID) injection 0.5 mg, 0.5 mg, Intravenous, Q1H PRN, Joshua Magana MD, 0.5 mg at 01/06/19 1643  •  methocarbamol (ROBAXIN) tablet 750 mg, 750 mg, Oral, TID PRN, Joshua Magana MD  •  ondansetron (ZOFRAN) injection 4 mg, 4 mg, Intravenous, Q6H PRN, Joshua Magana MD  •  ondansetron (ZOFRAN) tablet 4 mg, 4 mg, Oral, Q6H PRN **OR** ondansetron ODT (ZOFRAN-ODT) disintegrating tablet 4 mg, 4 mg, Oral, Q6H PRN **OR** ondansetron (ZOFRAN) injection 4 mg, 4 mg, Intravenous, Q6H PRN, Joshua Magana MD  •  ondansetron (ZOFRAN) tablet 4 mg, 4 mg, Oral, Q8H PRN, Joshua Magana MD  •  oxyCODONE-acetaminophen (PERCOCET) 7.5-325 MG per tablet 1 tablet, 1 tablet, Oral, Q6H PRN, Joshua Magana MD, 1 tablet at 01/05/19 1207  •  oxyCODONE-acetaminophen (PERCOCET) 7.5-325 MG per tablet 1 tablet, 1 tablet, Oral, Q4H PRN, Joshua Magana MD, 1 tablet at 01/07/19 0656  •  sodium chloride 0.9 % flush 3 mL, 3 mL, Intravenous, Q12H, Joshua Magana MD, 3 mL at 01/06/19 2212  •  sodium chloride 0.9 % flush 3-10 mL, 3-10 mL, Intravenous, PRN, Joshua Magana MD  •  SUMAtriptan (IMITREX) tablet 100 mg, 100 mg, Oral, Q2H PRN, Joshua Magana MD  •   traZODone (DESYREL) tablet 50 mg, 50 mg, Oral, Nightly, Joshua Magana MD  •  venlafaxine (EFFEXOR) tablet 37.5 mg, 37.5 mg, Oral, Nightly, Joshua Magana MD, 37.5 mg at 01/06/19 5526  Review of Systems:    All systems were reviewed and negative except for:  Gastrointestinal: positive for  constipation    Objective     Vital Signs  Temp:  [98.4 °F (36.9 °C)-98.8 °F (37.1 °C)] 98.4 °F (36.9 °C)  Heart Rate:  [83-97] 83  Resp:  [16-20] 16  BP: (106-141)/(65-79) 106/65  Body mass index is 29.8 kg/m².    Intake/Output Summary (Last 24 hours) at 1/7/2019 0926  Last data filed at 1/7/2019 0900  Gross per 24 hour   Intake 805 ml   Output 38 ml   Net 767 ml     I/O this shift:  In: 100 [P.O.:100]  Out: -      Physical Exam:   General: patient awake, alert and cooperative   Eyes: Normal lids and lashes, no scleral icterus   Neck: supple, normal ROM   Skin: warm and dry, not jaundiced   Abdomen: soft, nontender, nondistended   Psychiatric: Normal mood and behavior; memory intact     Results Review:     I reviewed the patient's new clinical results.    Results from last 7 days   Lab Units  01/06/19   0527 01/05/19   1006   WBC 10*3/mm3  12.59*  13.32*   HEMOGLOBIN g/dL  9.9*  10.6*   HEMATOCRIT %  29.0*  31.1*   PLATELETS 10*3/mm3  322  294     Results from last 7 days   Lab Units  01/07/19   0715  01/06/19   0527  01/05/19   1409   SODIUM mmol/L  138  136  139   POTASSIUM mmol/L  3.4*  3.5  3.6   CHLORIDE mmol/L  99  99  100   CO2 mmol/L  28.0  26.0  27.1   BUN mg/dL  12  12  13   CREATININE mg/dL  0.60  0.54*  0.72   CALCIUM mg/dL  9.0  8.7  8.8   BILIRUBIN mg/dL  0.6  1.0  1.1   ALK PHOS U/L  337*  411*  439*   ALT (SGPT) U/L  321*  569*  785*   AST (SGOT) U/L  97*  413*  1,152*   GLUCOSE mg/dL  106*  113*  103*     Results from last 7 days   Lab Units  01/07/19   0715  01/06/19   0527  01/05/19   1006   INR   1.45*  1.24*  1.33*     Lab Results   Lab Value Date/Time    LIPASE 33 03/12/2015 1405       Radiology:  CT  Abdomen Pelvis With & Without Contrast   Final Result   IMPRESSION :    1. Pulmonary findings as discussed. These will require follow-up.   2. Constipation without obstruction.   3. Right adnexal cyst, outpatient ultrasound follow-up recommended to   ensure resolution.                            This report was finalized on 1/6/2019 4:55 AM by Min Flowers M.D.          US Gallbladder   Final Result   1. Unremarkable right upper quadrant ultrasound.       This report was finalized on 1/5/2019 7:11 PM by Min Flowers M.D.              Assessment/Plan     Patient Active Problem List   Diagnosis   • Atopic dermatitis   • Carpal tunnel syndrome   • Dyslipidemia   • Fatigue   • Cervical pain   • Vitamin D deficiency   • Depression   • Pulmonary emboli (CMS/HCC)   • Acute deep vein thrombosis (DVT) of distal vein of left lower extremity (CMS/HCC)   • Elevated liver function tests     Assessment-  1.  Acute elevation in liver enzymes: rapidly improving, consistent with acute shock liver.  Normal liver on imaging, normal tylenol level, normal portal duplex US  2.  Acute pulmonary embolism-following recent surgery, on eliquis  3. Acute on chronic constipation: no BM in 10 days, stool burden on CT imaging     Plan  Daily CMP, anticipated continued improvement  Bowel regimen        I discussed the patients findings and my recommendations with patient, family and nursing staff.    Padmini Amezquita MD

## 2019-01-07 NOTE — PROGRESS NOTES
Exercise Oximetry    Patient Name:Merari Bowen   MRN: [id number]   Date: 01/07/19             ROOM AIR BASELINE   SpO2% 93   Heart Rate 108   Blood Pressure N/A     EXERCISE ON ROOM AIR SpO2% EXERCISE ON O2 @ 2 LPM SpO2%   1 MINUTE 84 1 MINUTE 97   2 MINUTES N/A 2 MINUTES 90   3 MINUTES N/A 3 MINUTES 87   4 MINUTES N/A 4 MINUTES N/A   5 MINUTES N/A 5 MINUTES N/A   6 MINUTES N/A 6 MINUTES N/A              Distance Walked  N/A Distance Walked   Dyspnea (Sandor Scale) N/A Dyspnea (Sandor Scale)   Fatigue (Sandor Scale) N/A Fatigue (Sandor Scale)   SpO2% Post Exercise 92 SpO2% Post Exercise   HR Post Exercise  114 HR Post Exercise   Time to Recovery  2 MIN Time to Recovery     Comments: Walk stopped  after 87% SPO2 on 2NC.Patient complained of back pain throughout

## 2019-01-07 NOTE — PROGRESS NOTES
"    DAILY PROGRESS NOTE  [facility]    Patient Identification:  Name: Merari Bowen  Age: 44 y.o.  Sex: female  :  1975  MRN: [id number]         Primary Care Physician: Adrian Metcalf MD    Subjective:  Interval History: no longer w/ CP but sob is not any better - can barely finish sentences due to sob - hard to do IS given sob/cough - no f/c/ns/n/v/d - no further issues w/ hemoptysis (one time)     Objective:spouse at bs     Scheduled Meds:    apixaban 10 mg Oral Q12H   Followed by      [START ON 2019] apixaban 5 mg Oral Q12H   budesonide 1 mg Nebulization BID - RT   buPROPion  mg Oral Nightly   furosemide 40 mg Intravenous Once   ipratropium-albuterol 3 mL Nebulization 4x Daily - RT   polyethylene glycol 17 g Oral BID   potassium chloride 40 mEq Oral Q4H   sennosides-docusate sodium 2 tablet Oral BID   sodium chloride 3 mL Intravenous Q12H   traZODone 50 mg Oral Nightly   venlafaxine 37.5 mg Oral Nightly     Continuous Infusions:     Vital signs in last 24 hours:  Temp:  [98.4 °F (36.9 °C)-98.8 °F (37.1 °C)] 98.5 °F (36.9 °C)  Heart Rate:  [83-97] 91  Resp:  [16-20] 18  BP: (106-141)/(65-79) 118/78    Intake/Output:    Intake/Output Summary (Last 24 hours) at 2019 1702  Last data filed at 2019 1600  Gross per 24 hour   Intake 1045 ml   Output 1848 ml   Net -803 ml       Exam:  /78 (BP Location: Left arm, Patient Position: Lying)   Pulse 91   Temp 98.5 °F (36.9 °C) (Oral)   Resp 18   Ht 172.7 cm (68\")   Wt 88.9 kg (196 lb)   LMP  (Within Years) Comment: 6 years  SpO2 94%   BMI 29.80 kg/m²     General Appearance:    Alert, cooperative, AAOx3, can barely finish a sentence due to sob/cough                           Head:    Normocephalic, without obvious abnormality, atraumatic                           Eyes:    PERRL, conjunctiva/corneas clear, EOM's intact, both eyes                         Throat:   Lips, tongue, gums normal; oral mucosa pink and " moist                           Neck:   Supple, symmetrical, trachea midline, no JVD                         Lungs:    Bibasilar rales > in LLL w/out wheeze to auscultation bilaterally, respirations + labored                         Heart:    Regular rate and rhythm, S1 and S2 normal                  Abdomen:     Soft, non-tender, bowel sounds active                 Extremities:   No cyanosis                         Pulses:   Pulses palpable in lower extremities                  Neurologic:   CNII-XII intact, moving all w/out focal deficits      Data Review:  Labs in chart were reviewed.    Assessment:  Active Hospital Problems    Diagnosis Date Noted   • **Pulmonary emboli (CMS/HCC) [I26.99] 01/05/2019   • Hypoxia [R09.02] 01/07/2019   • Acute deep vein thrombosis (DVT) of distal vein of left lower extremity (CMS/HCC) [I82.4Z2] 01/05/2019   • Elevated liver function tests [R94.5] 01/05/2019   • Depression [F32.9] 01/03/2017   • Dyslipidemia [E78.5] 03/09/2016      Resolved Hospital Problems   No resolved problems to display.       Plan:  Not ready to DC - quite SOB and can barely complete sentences    -add Duonebs and Pulmicort - phx asthma    -small effusions noted on previous imaging - trial IV Lasix at 40mg x1 as concern for possible fluid overload and check CXR in am    -counseled IS technique and its importance   -repeat CXR in am - check PCT   -Eliquis initiated 1/6/18   -DVT/PE secondary to postop complications - No FHx thrombosis     LFTs - transient and improving     Constipation - increase bowel regimen - chronic issue o/w     Dispo - no DC yet as not ready - would prefer home not on o2    Andrew Valadez MD  1/7/2019  5:02 PM

## 2019-01-07 NOTE — PLAN OF CARE
Problem: Patient Care Overview  Goal: Plan of Care Review  Outcome: Ongoing (interventions implemented as appropriate)   01/07/19 0556   Coping/Psychosocial   Plan of Care Reviewed With patient   Plan of Care Review   Progress improving   OTHER   Outcome Summary VSS, 1L O2 - SOA with activity, encouraging TCDB, IS - low pt tolerance. Eliquis given at 2211 and heparin gtt stopped 1 hr after. LOU drain in place. Walking oximetry ordered for AM. Will CTM.        Problem: Fall Risk (Adult)  Goal: Identify Related Risk Factors and Signs and Symptoms  Outcome: Ongoing (interventions implemented as appropriate)      Problem: VTE, DVT and PE (Adult)  Goal: Signs and Symptoms of Listed Potential Problems Will be Absent, Minimized or Managed (VTE, DVT and PE)  Outcome: Ongoing (interventions implemented as appropriate)      Problem: Skin Injury Risk (Adult)  Goal: Identify Related Risk Factors and Signs and Symptoms  Outcome: Ongoing (interventions implemented as appropriate)

## 2019-01-08 ENCOUNTER — APPOINTMENT (OUTPATIENT)
Dept: GENERAL RADIOLOGY | Facility: HOSPITAL | Age: 44
End: 2019-01-08

## 2019-01-08 PROBLEM — J90 PLEURAL EFFUSION: Status: ACTIVE | Noted: 2019-01-08

## 2019-01-08 LAB
ALBUMIN SERPL-MCNC: 2.8 G/DL (ref 3.5–5.2)
ALBUMIN/GLOB SERPL: 0.6 G/DL
ALP SERPL-CCNC: 310 U/L (ref 39–117)
ALT SERPL W P-5'-P-CCNC: 221 U/L (ref 1–33)
ANION GAP SERPL CALCULATED.3IONS-SCNC: 9.1 MMOL/L
AST SERPL-CCNC: 41 U/L (ref 1–32)
BILIRUB SERPL-MCNC: 0.5 MG/DL (ref 0.1–1.2)
BUN BLD-MCNC: 10 MG/DL (ref 6–20)
BUN/CREAT SERPL: 17.2 (ref 7–25)
CALCIUM SPEC-SCNC: 9.1 MG/DL (ref 8.6–10.5)
CHLORIDE SERPL-SCNC: 97 MMOL/L (ref 98–107)
CO2 SERPL-SCNC: 29.9 MMOL/L (ref 22–29)
CREAT BLD-MCNC: 0.58 MG/DL (ref 0.57–1)
DEPRECATED RDW RBC AUTO: 42.6 FL (ref 37–54)
ERYTHROCYTE [DISTWIDTH] IN BLOOD BY AUTOMATED COUNT: 12.3 % (ref 11.7–13)
GFR SERPL CREATININE-BSD FRML MDRD: 112 ML/MIN/1.73
GLOBULIN UR ELPH-MCNC: 4.4 GM/DL
GLUCOSE BLD-MCNC: 103 MG/DL (ref 65–99)
HCT VFR BLD AUTO: 31 % (ref 35.6–45.5)
HGB BLD-MCNC: 10 G/DL (ref 11.9–15.5)
INR PPP: 1.4 (ref 0.9–1.1)
MCH RBC QN AUTO: 30.9 PG (ref 26.9–32)
MCHC RBC AUTO-ENTMCNC: 32.3 G/DL (ref 32.4–36.3)
MCV RBC AUTO: 95.7 FL (ref 80.5–98.2)
PLATELET # BLD AUTO: 407 10*3/MM3 (ref 140–500)
PMV BLD AUTO: 9.8 FL (ref 6–12)
POTASSIUM BLD-SCNC: 3.5 MMOL/L (ref 3.5–5.2)
PROCALCITONIN SERPL-MCNC: 0.08 NG/ML (ref 0.1–0.25)
PROT SERPL-MCNC: 7.2 G/DL (ref 6–8.5)
PROTHROMBIN TIME: 16.9 SECONDS (ref 11.7–14.2)
RBC # BLD AUTO: 3.24 10*6/MM3 (ref 3.9–5.2)
SODIUM BLD-SCNC: 136 MMOL/L (ref 136–145)
WBC NRBC COR # BLD: 12.34 10*3/MM3 (ref 4.5–10.7)

## 2019-01-08 PROCEDURE — 80053 COMPREHEN METABOLIC PANEL: CPT | Performed by: HOSPITALIST

## 2019-01-08 PROCEDURE — 94799 UNLISTED PULMONARY SVC/PX: CPT

## 2019-01-08 PROCEDURE — 84145 PROCALCITONIN (PCT): CPT | Performed by: HOSPITALIST

## 2019-01-08 PROCEDURE — 71045 X-RAY EXAM CHEST 1 VIEW: CPT

## 2019-01-08 PROCEDURE — 85027 COMPLETE CBC AUTOMATED: CPT | Performed by: HOSPITALIST

## 2019-01-08 PROCEDURE — 25010000002 FUROSEMIDE PER 20 MG: Performed by: HOSPITALIST

## 2019-01-08 PROCEDURE — 85610 PROTHROMBIN TIME: CPT | Performed by: HOSPITALIST

## 2019-01-08 PROCEDURE — 99232 SBSQ HOSP IP/OBS MODERATE 35: CPT | Performed by: INTERNAL MEDICINE

## 2019-01-08 RX ORDER — POTASSIUM CHLORIDE 750 MG/1
40 CAPSULE, EXTENDED RELEASE ORAL EVERY 4 HOURS
Status: COMPLETED | OUTPATIENT
Start: 2019-01-08 | End: 2019-01-08

## 2019-01-08 RX ORDER — FUROSEMIDE 10 MG/ML
40 INJECTION INTRAMUSCULAR; INTRAVENOUS ONCE
Status: COMPLETED | OUTPATIENT
Start: 2019-01-08 | End: 2019-01-08

## 2019-01-08 RX ADMIN — POTASSIUM CHLORIDE 40 MEQ: 750 CAPSULE, EXTENDED RELEASE ORAL at 16:57

## 2019-01-08 RX ADMIN — IPRATROPIUM BROMIDE AND ALBUTEROL SULFATE 3 ML: 2.5; .5 SOLUTION RESPIRATORY (INHALATION) at 07:19

## 2019-01-08 RX ADMIN — BUDESONIDE 1 MG: 1 SUSPENSION RESPIRATORY (INHALATION) at 07:19

## 2019-01-08 RX ADMIN — SODIUM CHLORIDE, PRESERVATIVE FREE 3 ML: 5 INJECTION INTRAVENOUS at 21:10

## 2019-01-08 RX ADMIN — POTASSIUM CHLORIDE 40 MEQ: 750 CAPSULE, EXTENDED RELEASE ORAL at 11:44

## 2019-01-08 RX ADMIN — OXYCODONE HYDROCHLORIDE AND ACETAMINOPHEN 1 TABLET: 7.5; 325 TABLET ORAL at 22:01

## 2019-01-08 RX ADMIN — APIXABAN 10 MG: 5 TABLET, FILM COATED ORAL at 21:09

## 2019-01-08 RX ADMIN — OXYCODONE HYDROCHLORIDE AND ACETAMINOPHEN 1 TABLET: 7.5; 325 TABLET ORAL at 14:08

## 2019-01-08 RX ADMIN — OXYCODONE HYDROCHLORIDE AND ACETAMINOPHEN 1 TABLET: 7.5; 325 TABLET ORAL at 09:45

## 2019-01-08 RX ADMIN — SENNOSIDES AND DOCUSATE SODIUM 2 TABLET: 8.6; 5 TABLET ORAL at 21:09

## 2019-01-08 RX ADMIN — SENNOSIDES AND DOCUSATE SODIUM 2 TABLET: 8.6; 5 TABLET ORAL at 09:45

## 2019-01-08 RX ADMIN — BUPROPION HYDROCHLORIDE 300 MG: 300 TABLET, EXTENDED RELEASE ORAL at 21:09

## 2019-01-08 RX ADMIN — IPRATROPIUM BROMIDE AND ALBUTEROL SULFATE 3 ML: 2.5; .5 SOLUTION RESPIRATORY (INHALATION) at 14:46

## 2019-01-08 RX ADMIN — VENLAFAXINE 37.5 MG: 37.5 TABLET ORAL at 21:10

## 2019-01-08 RX ADMIN — BUDESONIDE 1 MG: 1 SUSPENSION RESPIRATORY (INHALATION) at 22:35

## 2019-01-08 RX ADMIN — IPRATROPIUM BROMIDE AND ALBUTEROL SULFATE 3 ML: 2.5; .5 SOLUTION RESPIRATORY (INHALATION) at 10:04

## 2019-01-08 RX ADMIN — SODIUM CHLORIDE, PRESERVATIVE FREE 3 ML: 5 INJECTION INTRAVENOUS at 11:46

## 2019-01-08 RX ADMIN — APIXABAN 10 MG: 5 TABLET, FILM COATED ORAL at 09:45

## 2019-01-08 RX ADMIN — IPRATROPIUM BROMIDE AND ALBUTEROL SULFATE 3 ML: 2.5; .5 SOLUTION RESPIRATORY (INHALATION) at 22:35

## 2019-01-08 RX ADMIN — OXYCODONE HYDROCHLORIDE AND ACETAMINOPHEN 1 TABLET: 7.5; 325 TABLET ORAL at 18:07

## 2019-01-08 RX ADMIN — FUROSEMIDE 40 MG: 10 INJECTION, SOLUTION INTRAMUSCULAR; INTRAVENOUS at 11:44

## 2019-01-08 RX ADMIN — SODIUM CHLORIDE, PRESERVATIVE FREE 3 ML: 5 INJECTION INTRAVENOUS at 09:47

## 2019-01-08 NOTE — PROGRESS NOTES
"    DAILY PROGRESS NOTE  [facility]    Patient Identification:  Name: Merari Bowen  Age: 44 y.o.  Sex: female  :  1975  MRN: [id number]         Primary Care Physician: Adrian Metcalf MD    Subjective:  Interval History: Good urine output last night status post IV Lasix ×1.  Patient noted immediate improvement in her breathing status.  She is no longer struggling to breathe with inability to complete sentences and is able to work with incentive spirometry o/w.  Denies chest pain or bleeding    Objective: Family members ×2 at bedside    Scheduled Meds:    apixaban 10 mg Oral Q12H   Followed by      [START ON 2019] apixaban 5 mg Oral Q12H   budesonide 1 mg Nebulization BID - RT   buPROPion  mg Oral Nightly   furosemide 40 mg Intravenous Once   ipratropium-albuterol 3 mL Nebulization 4x Daily - RT   polyethylene glycol 17 g Oral BID   potassium chloride 40 mEq Oral Q4H   sennosides-docusate sodium 2 tablet Oral BID   sodium chloride 3 mL Intravenous Q12H   traZODone 50 mg Oral Nightly   venlafaxine 37.5 mg Oral Nightly     Continuous Infusions:     Vital signs in last 24 hours:  Temp:  [98.3 °F (36.8 °C)-100.2 °F (37.9 °C)] 98.4 °F (36.9 °C)  Heart Rate:  [] 101  Resp:  [16-18] 16  BP: (118-146)/(74-85) 125/74    Intake/Output:    Intake/Output Summary (Last 24 hours) at 2019 1105  Last data filed at 2019 1000  Gross per 24 hour   Intake 1170 ml   Output 2210 ml   Net -1040 ml       Exam:  /74 (BP Location: Left arm, Patient Position: Lying)   Pulse 101   Temp 98.4 °F (36.9 °C) (Oral)   Resp 16   Ht 172.7 cm (68\")   Wt 88.9 kg (196 lb)   LMP  (Within Years) Comment: 6 years  SpO2 92%   BMI 29.80 kg/m²     General Appearance:    Alert, cooperative, no distress, AAOx3, clinically much improved                        Throat:   Lips, tongue, gums normal; oral mucosa pink and moist                           Neck:   Supple, symmetrical, trachea " midline, no JVD                         Lungs:    Improving bibasilar Rales to auscultation bilaterally, respirations not labored today                 Chest Wall:    No tenderness or deformity                          Heart:    Regular rate and rhythm, S1 and S2 normal                  Abdomen:     Soft, non-tender, bowel sounds active                 Extremities:   TEDs, no cyanosis with trace edema                             Data Review:  Labs in chart were reviewed.    Assessment:  Active Hospital Problems    Diagnosis Date Noted   • **Pulmonary emboli (CMS/HCC) [I26.99] 01/05/2019   • Pleural effusion [J90] 01/08/2019   • Hypoxia [R09.02] 01/07/2019   • Acute deep vein thrombosis (DVT) of distal vein of left lower extremity (CMS/HCC) [I82.4Z2] 01/05/2019   • Elevated liver function tests [R94.5] 01/05/2019   • Depression [F32.9] 01/03/2017   • Dyslipidemia [E78.5] 03/09/2016      Resolved Hospital Problems   No resolved problems to display.       Plan:  Clinically improved secondary to IV Lasix as patient had pleural effusions likely secondary to acute PE and cardiac strain though echocardiogram is otherwise unremarkable with preserved EF   -Excellent response to IV Lasix ×1 and will give an additional dose today - chest x-ray reviewed   -Continue duo nebs and Pulmicort for adjunctive therapy   -Eliquis    LFTs - transient and improving - likely secondary to passive congestion     Constipation - increased bowel regimen - chronic issue o/w      Dispo - continue to wean oxygen.  Hopeful for discharge tomorrow    Andrew Valadez MD  1/8/2019  11:05 AM

## 2019-01-08 NOTE — PROGRESS NOTES
"                                              LOS: 3 days   Patient Care Team:  Adrian Metcalf MD as PCP - General (Family Medicine)  Chandan Rubio DO as PCP - Family Medicine    Chief Complaint:  Follow-up on PE, dyspnea and pleuritic chest pain and medical problems listed below    Interval History:   Ing better with IS today. 500-1000. Still have significant pain, manly located in her abdomen at the site of surgery. She cannot talk full sentenses due to pain and cough when she takes a breath. Cough is non productive and worse with seep breathing.    REVIEW OF SYSTEMS:      RESPIRATORY: Dyspnea when she walks the bathroom  HEMATOLOGIC: No bleeding or bruising.     Ventilator/Non-Invasive Ventilation Settings (From admission, onward)    None            Vital Signs  Temp:  [98.3 °F (36.8 °C)-100.2 °F (37.9 °C)] 100.2 °F (37.9 °C)  Heart Rate:  [] 95  Resp:  [16-18] 18  BP: (106-146)/(65-85) 146/85    Intake/Output Summary (Last 24 hours) at 1/8/2019 0034  Last data filed at 1/7/2019 1800  Gross per 24 hour   Intake 1060 ml   Output 2478 ml   Net -1418 ml     Flowsheet Rows      First Filed Value   Admission Height  172.7 cm (68\") Documented at 01/05/2019 0901   Admission Weight  84.2 kg (185 lb 10 oz) Documented at 01/05/2019 0901          Physical Exam:   General Appearance:    Alert, cooperative, in no acute distress   HEENT:  Mallampati score 3, moist mucous membrane   Neck:   Large. Trachea midline. No thyromegaly.   Lungs:     Very diminished air entry bilaterally due to poor inspiratory effort.  Bilateral basal crackles.  No wheezing     Heart:    Regular rhythm and normal rate, normal S1 and S2, no            murmur   Skin:    No abnormalities observed   Abdomen:     Obese. Soft. No tenderness. No HSM.   Neuro:   Conscious, alert, oriented x3   Extremities:   Moves all extremities well, no edema, no cyanosis, no             Redness          Results Review:        Results from last 7 days "   Lab Units  01/07/19   0715  01/06/19   0527  01/05/19   1409   SODIUM mmol/L  138  136  139   POTASSIUM mmol/L  3.4*  3.5  3.6   CHLORIDE mmol/L  99  99  100   CO2 mmol/L  28.0  26.0  27.1   BUN mg/dL  12  12  13   CREATININE mg/dL  0.60  0.54*  0.72   GLUCOSE mg/dL  106*  113*  103*   CALCIUM mg/dL  9.0  8.7  8.8         Results from last 7 days   Lab Units  01/06/19   0527  01/05/19   1006   WBC 10*3/mm3  12.59*  13.32*   HEMOGLOBIN g/dL  9.9*  10.6*   HEMATOCRIT %  29.0*  31.1*   PLATELETS 10*3/mm3  322  294     Results from last 7 days   Lab Units  01/07/19   0715  01/06/19   0527  01/05/19   1744  01/05/19   1409  01/05/19   1006   INR   1.45*  1.24*   --    --   1.33*   APTT seconds   --   90.2*  83.6*  96.6*  94.5*     Results from last 7 days   Lab Units  01/05/19   1409   PROBNP pg/mL  33.5       I reviewed the patient's new clinical results.  I personally viewed and interpreted the patient's CXR        Medication Review:     apixaban 10 mg Oral Q12H   Followed by      [START ON 1/13/2019] apixaban 5 mg Oral Q12H   budesonide 1 mg Nebulization BID - RT   buPROPion  mg Oral Nightly   ipratropium-albuterol 3 mL Nebulization 4x Daily - RT   polyethylene glycol 17 g Oral BID   sennosides-docusate sodium 2 tablet Oral BID   sodium chloride 3 mL Intravenous Q12H   traZODone 50 mg Oral Nightly   venlafaxine 37.5 mg Oral Nightly          Reviewed the echocardiogram report dated 1/5/19.  Poor window.  No pulmonary hypertension    Assessment:     1. Acute right lower lobe segmental pulmonary embolism  2. Acute left lower extremity deep, gastrocnemius/soleal, likely provoked by recent surgery and immobilization   3. Pleuritic right sided chest/back pain, secondary to pulmonary infarct  4. right lower lobe pulmonary infarct  5. Bilateral lower lobe atelectasis  6. Mild hemoptysis, likely secondary to pulmonary infarct      other pertinent medical problems  6. Elevated transaminase  7. Asthma, exercise  induced     Plan:    · Continue IS. Doing better with it today  · Agree with inhaled steroid for cough  · Also I changed the Percocet to be used for pain or cough as needed. She has allergy to codeine.   · Continue Shadi Reeder MD  01/08/19  12:34 AM          This note was dictated utilizing Karaz dictation

## 2019-01-08 NOTE — PLAN OF CARE
Problem: Patient Care Overview  Goal: Plan of Care Review  Outcome: Ongoing (interventions implemented as appropriate)   01/07/19 1830   Coping/Psychosocial   Plan of Care Reviewed With spouse;patient   Plan of Care Review   Progress improving   OTHER   Outcome Summary VSS, enc t,c,db q 2hrs, IS q 2hrs, ambulation enc tid, 02 at 2 liters per N/C , enc pain meds q 4hrs      Goal: Individualization and Mutuality  Outcome: Ongoing (interventions implemented as appropriate)   01/07/19 1830   Individualization   Patient Specific Preferences lights dimmed, door closed, spouse to stay with her in room    Patient Specific Goals (Include Timeframe) pain controlled, no sob, home soon, oxygen weaned    Patient Specific Interventions monitor vitals and oxygen sats, oxygen therapy, pain meds prn q 4hrs, enc ambulation tid, enc t,c,db q 2hrs , IS q 2 hrs    Mutuality/Individual Preferences   What Anxieties, Fears, Concerns, or Questions Do You Have About Your Care? length of hospital stay, pain control, gen weakness, oxygen therapy    What Information Would Help Us Give You More Personalized Care? daily updates on plan of care given    How Would You and/or Your Support Person Like to Participate in Your Care? be involved in decision making re: plan of care    Mutuality/Individual Preferences   How to Address Anxieties/Fears discuss needs/concerns with [facility] staff/doctors      Goal: Discharge Needs Assessment  Outcome: Ongoing (interventions implemented as appropriate)   01/07/19 1830   Discharge Needs Assessment   Readmission Within the Last 30 Days no previous admission in last 30 days   Concerns to be Addressed discharge planning;denies needs/concerns at this time   Patient/Family Anticipates Transition to home with family;home with help/services   Patient/Family Anticipated Services at Transition home health care   Transportation Concerns car, none   Transportation Anticipated family or friend will provide   Equipment Needed  After Discharge oxygen;nebulizer   Outpatient/Agency/Support Group Needs homecare agency   Discharge Facility/Level of Care Needs home with home health   Discharge Coordination/Progress will likely need home health to follow at home once discharged    Disability   Equipment Currently Used at Home nebulizer;oxygen       Problem: Fall Risk (Adult)  Goal: Identify Related Risk Factors and Signs and Symptoms  Outcome: Ongoing (interventions implemented as appropriate)   01/07/19 1830   Fall Risk (Adult)   Related Risk Factors (Fall Risk) sleep pattern alteration;environment unfamiliar;gait/mobility problems   Signs and Symptoms (Fall Risk) presence of risk factors     Goal: Absence of Fall  Outcome: Ongoing (interventions implemented as appropriate)   01/07/19 1830   Fall Risk (Adult)   Absence of Fall making progress toward outcome       Problem: VTE, DVT and PE (Adult)  Goal: Signs and Symptoms of Listed Potential Problems Will be Absent, Minimized or Managed (VTE, DVT and PE)  Outcome: Ongoing (interventions implemented as appropriate)   01/07/19 1830   Goal/Outcome Evaluation   Problems Assessed (VTE, DVT, PE) all   Problems Present (VTE, DVT, PE) deep vein thrombosis;pulmonary embolism       Problem: Skin Injury Risk (Adult)  Goal: Identify Related Risk Factors and Signs and Symptoms  Outcome: Ongoing (interventions implemented as appropriate)   01/07/19 1830   Skin Injury Risk (Adult)   Related Risk Factors (Skin Injury Risk) mobility impaired;fluid intake inadeq   01/07/19 1830   Skin Injury Risk (Adult)   Related Risk Factors (Skin Injury Risk) mobility impaired;fluid intake inadequate   uate     Goal: Skin Health and Integrity  Outcome: Ongoing (interventions implemented as appropriate)   01/07/19 1830   Skin Injury Risk (Adult)   Skin Health and Integrity making progress toward outcome

## 2019-01-08 NOTE — PROGRESS NOTES
[facility]  Inpatient Progress Note    Reason for Follow Up:  Abnormal liver tests chronic constipation    Subjective     Interval History:   Still no bowel movement but she does not want laxatives at this time    Current Facility-Administered Medications:   •  apixaban (ELIQUIS) tablet 10 mg, 10 mg, Oral, Q12H, 10 mg at 01/08/19 0945 **FOLLOWED BY** [START ON 1/13/2019] apixaban (ELIQUIS) tablet 5 mg, 5 mg, Oral, Q12H, Aníbal Reeder MD  •  bisacodyl (DULCOLAX) EC tablet 5 mg, 5 mg, Oral, Daily PRN, Joshua Magana MD  •  budesonide (PULMICORT) nebulizer solution 1 mg, 1 mg, Nebulization, BID - RT, Andrew Valadez MD, 1 mg at 01/08/19 0719  •  buPROPion XL (WELLBUTRIN XL) 24 hr tablet 300 mg, 300 mg, Oral, Nightly, Joshua Magana MD, 150 mg at 01/07/19 2239  •  ipratropium-albuterol (DUO-NEB) nebulizer solution 3 mL, 3 mL, Nebulization, 4x Daily - RT, Andrew Valadez MD, 3 mL at 01/08/19 1004  •  methocarbamol (ROBAXIN) tablet 750 mg, 750 mg, Oral, TID PRN, Joshua Magana MD  •  ondansetron (ZOFRAN) tablet 4 mg, 4 mg, Oral, Q6H PRN **OR** ondansetron ODT (ZOFRAN-ODT) disintegrating tablet 4 mg, 4 mg, Oral, Q6H PRN **OR** ondansetron (ZOFRAN) injection 4 mg, 4 mg, Intravenous, Q6H PRN, Joshua Magana MD  •  oxyCODONE-acetaminophen (PERCOCET) 7.5-325 MG per tablet 1 tablet, 1 tablet, Oral, Q4H PRN, Joshua Magana MD, 1 tablet at 01/08/19 0945  •  polyethylene glycol (MIRALAX) powder 17 g, 17 g, Oral, BID, Andrew Valadez MD  •  potassium chloride (MICRO-K) CR capsule 40 mEq, 40 mEq, Oral, Q4H, Andrew Valadez MD, 40 mEq at 01/08/19 1144  •  sennosides-docusate sodium (SENOKOT-S) 8.6-50 MG tablet 2 tablet, 2 tablet, Oral, BID, Andrew Valadez MD, 2 tablet at 01/08/19 0945  •  sodium chloride 0.9 % flush 3 mL, 3 mL, Intravenous, Q12H, Joshua Magana MD, 3 mL at 01/08/19 0947  •  sodium chloride 0.9 % flush 3-10 mL, 3-10 mL, Intravenous, PRN, Joshua Magana MD, 3 mL at  01/08/19 1146  •  traZODone (DESYREL) tablet 50 mg, 50 mg, Oral, Nightly, Joshua Magana MD  •  venlafaxine (EFFEXOR) tablet 37.5 mg, 37.5 mg, Oral, Nightly, Joshua Magana MD, 37.5 mg at 01/07/19 4548  Review of Systems:    She has weakness but denies abdominal pain or bloating, constipation continues all the systems reviewed and negative    Objective     Vital Signs  Temp:  [98.3 °F (36.8 °C)-100.2 °F (37.9 °C)] 98.3 °F (36.8 °C)  Heart Rate:  [] 118  Resp:  [16-18] 16  BP: (125-146)/(74-85) 131/76  Body mass index is 29.8 kg/m².    Intake/Output Summary (Last 24 hours) at 1/8/2019 1327  Last data filed at 1/8/2019 1248  Gross per 24 hour   Intake 1030 ml   Output 2310 ml   Net -1280 ml     I/O this shift:  In: 430 [P.O.:430]  Out: 1050 [Urine:1050]     Physical Exam:   General: patient awake, alert and cooperative   Eyes: Normal lids and lashes, no scleral icterus   Neck: supple, normal ROM   Skin: warm and dry, not jaundiced   Cardiovascular: regular rhythm and rate, no murmurs auscultated   Pulm: clear to auscultation bilaterally, regular and unlabored   Abdomen: soft, nontender, nondistended; normal bowel sounds   Rectal: deferred   Extremities: no rash or edema   Psychiatric: Normal mood and behavior; memory intact     Results Review:     I reviewed the patient's new clinical results.    Results from last 7 days   Lab Units  01/08/19   0526  01/06/19   0527  01/05/19   1006   WBC 10*3/mm3  12.34*  12.59*  13.32*   HEMOGLOBIN g/dL  10.0*  9.9*  10.6*   HEMATOCRIT %  31.0*  29.0*  31.1*   PLATELETS 10*3/mm3  407  322  294     Results from last 7 days   Lab Units  01/08/19   0526  01/07/19   0715  01/06/19   0527   SODIUM mmol/L  136  138  136   POTASSIUM mmol/L  3.5  3.4*  3.5   CHLORIDE mmol/L  97*  99  99   CO2 mmol/L  29.9*  28.0  26.0   BUN mg/dL  10  12  12   CREATININE mg/dL  0.58  0.60  0.54*   CALCIUM mg/dL  9.1  9.0  8.7   BILIRUBIN mg/dL  0.5  0.6  1.0   ALK PHOS U/L  310*  337*  411*   ALT  (SGPT) U/L  221*  321*  569*   AST (SGOT) U/L  41*  97*  413*   GLUCOSE mg/dL  103*  106*  113*     Results from last 7 days   Lab Units  01/08/19   0949  01/07/19   0715  01/06/19   0527   INR   1.40*  1.45*  1.24*     Lab Results   Lab Value Date/Time    LIPASE 33 03/12/2015 1405       Radiology:  XR Chest 1 View   Final Result      CT Abdomen Pelvis With & Without Contrast   Final Result   IMPRESSION :    1. Pulmonary findings as discussed. These will require follow-up.   2. Constipation without obstruction.   3. Right adnexal cyst, outpatient ultrasound follow-up recommended to   ensure resolution.                            This report was finalized on 1/6/2019 4:55 AM by Min Flowers M.D.          US Gallbladder   Final Result   1. Unremarkable right upper quadrant ultrasound.       This report was finalized on 1/5/2019 7:11 PM by Min Flowers M.D.              Assessment/Plan     Patient Active Problem List   Diagnosis   • Atopic dermatitis   • Carpal tunnel syndrome   • Dyslipidemia   • Fatigue   • Cervical pain   • Vitamin D deficiency   • Depression   • Pulmonary emboli (CMS/HCC)   • Acute deep vein thrombosis (DVT) of distal vein of left lower extremity (CMS/HCC)   • Elevated liver function tests   • Hypoxia   • Pleural effusion     Assessment-  1.  Acute elevation in liver enzymes: rapidly improving, consistent with acute shock liver.  Normal liver on imaging, normal tylenol level, normal portal duplex US  2.  Acute pulmonary embolism-following recent surgery, on eliquis  3. Acute on chronic constipation: no BM in 10 days, stool burden on CT imaging     Plan  Daily CMP, anticipated continued improvement  Bowel regimen in the form of stool softener, she does not want laxatives  We agree with discharge home tomorrow      I discussed the patients findings and my recommendations with patient and family.    Davonte Stover MD

## 2019-01-08 NOTE — PROGRESS NOTES
Merariиван Zhenggina is a new start to Eliquis therapy.  Discussed Eliquis indication, mechanism, and dosing.     Also enforced the importance of taking med as instructed and at the same time every day and expressed understanding of this fact.   We also discussed what to do about a missed dose. Explained possible side effects of therapy, including increased risk of bleeding, s/sx of bleeding and s/sx of any additional clots/PE/CVA.     Discussed supplement/OTC interactions and need to get medications filled at one pharmacy. she expressed understanding of the information provided and has no additional questions at this time.    Carlee Pearce, PharmD, BCPS

## 2019-01-09 VITALS
WEIGHT: 196 LBS | OXYGEN SATURATION: 92 % | DIASTOLIC BLOOD PRESSURE: 66 MMHG | HEIGHT: 68 IN | BODY MASS INDEX: 29.7 KG/M2 | HEART RATE: 105 BPM | TEMPERATURE: 99 F | RESPIRATION RATE: 16 BRPM | SYSTOLIC BLOOD PRESSURE: 120 MMHG

## 2019-01-09 LAB
ALBUMIN SERPL-MCNC: 2.7 G/DL (ref 3.5–5.2)
ALBUMIN/GLOB SERPL: 0.6 G/DL
ALP SERPL-CCNC: 269 U/L (ref 39–117)
ALT SERPL W P-5'-P-CCNC: 150 U/L (ref 1–33)
ANION GAP SERPL CALCULATED.3IONS-SCNC: 9.7 MMOL/L
AST SERPL-CCNC: 27 U/L (ref 1–32)
BILIRUB SERPL-MCNC: 0.3 MG/DL (ref 0.1–1.2)
BUN BLD-MCNC: 14 MG/DL (ref 6–20)
BUN/CREAT SERPL: 21.5 (ref 7–25)
CALCIUM SPEC-SCNC: 9.5 MG/DL (ref 8.6–10.5)
CHLORIDE SERPL-SCNC: 99 MMOL/L (ref 98–107)
CO2 SERPL-SCNC: 29.3 MMOL/L (ref 22–29)
CREAT BLD-MCNC: 0.65 MG/DL (ref 0.57–1)
DEPRECATED RDW RBC AUTO: 43.6 FL (ref 37–54)
ERYTHROCYTE [DISTWIDTH] IN BLOOD BY AUTOMATED COUNT: 12.5 % (ref 11.7–13)
GFR SERPL CREATININE-BSD FRML MDRD: 98 ML/MIN/1.73
GLOBULIN UR ELPH-MCNC: 4.3 GM/DL
GLUCOSE BLD-MCNC: 100 MG/DL (ref 65–99)
HCT VFR BLD AUTO: 31.3 % (ref 35.6–45.5)
HGB BLD-MCNC: 10.1 G/DL (ref 11.9–15.5)
MCH RBC QN AUTO: 31 PG (ref 26.9–32)
MCHC RBC AUTO-ENTMCNC: 32.3 G/DL (ref 32.4–36.3)
MCV RBC AUTO: 96 FL (ref 80.5–98.2)
PLATELET # BLD AUTO: 437 10*3/MM3 (ref 140–500)
PMV BLD AUTO: 9.7 FL (ref 6–12)
POTASSIUM BLD-SCNC: 4.1 MMOL/L (ref 3.5–5.2)
PROT SERPL-MCNC: 7 G/DL (ref 6–8.5)
RBC # BLD AUTO: 3.26 10*6/MM3 (ref 3.9–5.2)
SODIUM BLD-SCNC: 138 MMOL/L (ref 136–145)
WBC NRBC COR # BLD: 13.34 10*3/MM3 (ref 4.5–10.7)

## 2019-01-09 PROCEDURE — 94618 PULMONARY STRESS TESTING: CPT

## 2019-01-09 PROCEDURE — 80053 COMPREHEN METABOLIC PANEL: CPT | Performed by: HOSPITALIST

## 2019-01-09 PROCEDURE — 94799 UNLISTED PULMONARY SVC/PX: CPT

## 2019-01-09 PROCEDURE — 85027 COMPLETE CBC AUTOMATED: CPT | Performed by: HOSPITALIST

## 2019-01-09 PROCEDURE — 99232 SBSQ HOSP IP/OBS MODERATE 35: CPT | Performed by: INTERNAL MEDICINE

## 2019-01-09 RX ORDER — SENNA AND DOCUSATE SODIUM 50; 8.6 MG/1; MG/1
2 TABLET, FILM COATED ORAL 2 TIMES DAILY
Start: 2019-01-09

## 2019-01-09 RX ADMIN — SENNOSIDES AND DOCUSATE SODIUM 2 TABLET: 8.6; 5 TABLET ORAL at 09:09

## 2019-01-09 RX ADMIN — OXYCODONE HYDROCHLORIDE AND ACETAMINOPHEN 1 TABLET: 7.5; 325 TABLET ORAL at 02:03

## 2019-01-09 RX ADMIN — SODIUM CHLORIDE, PRESERVATIVE FREE 3 ML: 5 INJECTION INTRAVENOUS at 09:10

## 2019-01-09 RX ADMIN — APIXABAN 10 MG: 5 TABLET, FILM COATED ORAL at 09:09

## 2019-01-09 RX ADMIN — IPRATROPIUM BROMIDE AND ALBUTEROL SULFATE 3 ML: 2.5; .5 SOLUTION RESPIRATORY (INHALATION) at 12:18

## 2019-01-09 RX ADMIN — IPRATROPIUM BROMIDE AND ALBUTEROL SULFATE 3 ML: 2.5; .5 SOLUTION RESPIRATORY (INHALATION) at 07:08

## 2019-01-09 RX ADMIN — OXYCODONE HYDROCHLORIDE AND ACETAMINOPHEN 1 TABLET: 7.5; 325 TABLET ORAL at 06:03

## 2019-01-09 RX ADMIN — BUDESONIDE 1 MG: 1 SUSPENSION RESPIRATORY (INHALATION) at 07:17

## 2019-01-09 RX ADMIN — OXYCODONE HYDROCHLORIDE AND ACETAMINOPHEN 1 TABLET: 7.5; 325 TABLET ORAL at 10:44

## 2019-01-09 RX ADMIN — OXYCODONE HYDROCHLORIDE AND ACETAMINOPHEN 1 TABLET: 7.5; 325 TABLET ORAL at 15:42

## 2019-01-09 NOTE — PROGRESS NOTES
"                                              LOS: 4 days   Patient Care Team:  Adrian Metcalf MD as PCP - General (Family Medicine)  Chandan Rubio DO as PCP - Family Medicine    Chief Complaint:  Follow-up on PE, dyspnea and pleuritic chest pain and medical problems listed below    Interval History:   Dyspnea improved today after nebs and diuresis.     Ventilator/Non-Invasive Ventilation Settings (From admission, onward)    None            Vital Signs  Temp:  [97.7 °F (36.5 °C)-99 °F (37.2 °C)] 99 °F (37.2 °C)  Heart Rate:  [] 105  Resp:  [16-20] 16  BP: (120-132)/(66-82) 120/66    Intake/Output Summary (Last 24 hours) at 1/9/2019 1652  Last data filed at 1/9/2019 0921  Gross per 24 hour   Intake 750 ml   Output 355 ml   Net 395 ml     Flowsheet Rows      First Filed Value   Admission Height  172.7 cm (68\") Documented at 01/05/2019 0901   Admission Weight  84.2 kg (185 lb 10 oz) Documented at 01/05/2019 0901          Physical Exam:   General Appearance:    Alert, cooperative, in no acute distress   HEENT:  Mallampati score 3, moist mucous membrane   Neck:   Large. Trachea midline. No thyromegaly.   Lungs:     Very diminished air entry bilaterally due to poor inspiratory effort.  Bilateral basal crackles.  No wheezing     Heart:    Regular rhythm and normal rate, normal S1 and S2, no            murmur   Skin:    No abnormalities observed   Abdomen:     Obese. Soft. No tenderness. No HSM.   Neuro:   Conscious, alert, oriented x3   Extremities:   Moves all extremities well, no edema, no cyanosis, no             Redness          Results Review:        Results from last 7 days   Lab Units  01/09/19   0530  01/08/19   0526  01/07/19   0715   SODIUM mmol/L  138  136  138   POTASSIUM mmol/L  4.1  3.5  3.4*   CHLORIDE mmol/L  99  97*  99   CO2 mmol/L  29.3*  29.9*  28.0   BUN mg/dL  14  10  12   CREATININE mg/dL  0.65  0.58  0.60   GLUCOSE mg/dL  100*  103*  106*   CALCIUM mg/dL  9.5  9.1  9.0       "   Results from last 7 days   Lab Units  01/09/19   0530  01/08/19   0526  01/06/19   0527   WBC 10*3/mm3  13.34*  12.34*  12.59*   HEMOGLOBIN g/dL  10.1*  10.0*  9.9*   HEMATOCRIT %  31.3*  31.0*  29.0*   PLATELETS 10*3/mm3  437  407  322     Results from last 7 days   Lab Units  01/08/19   0949  01/07/19   0715  01/06/19   0527  01/05/19   1744  01/05/19   1409   INR   1.40*  1.45*  1.24*   --    --    APTT seconds   --    --   90.2*  83.6*  96.6*     Results from last 7 days   Lab Units  01/05/19   1409   PROBNP pg/mL  33.5       I reviewed the patient's new clinical results.  I personally viewed and interpreted the patient's CXR        Medication Review:     apixaban 10 mg Oral Q12H   Followed by      [START ON 1/13/2019] apixaban 5 mg Oral Q12H   budesonide 1 mg Nebulization BID - RT   buPROPion  mg Oral Nightly   ipratropium-albuterol 3 mL Nebulization 4x Daily - RT   polyethylene glycol 17 g Oral BID   sennosides-docusate sodium 2 tablet Oral BID   sodium chloride 3 mL Intravenous Q12H   traZODone 50 mg Oral Nightly   venlafaxine 37.5 mg Oral Nightly          Reviewed the echocardiogram report dated 1/5/19.  Poor window.  No pulmonary hypertension    Assessment:     1. Acute right lower lobe segmental pulmonary embolism  2. Acute left lower extremity deep, gastrocnemius/soleal, likely provoked by recent surgery and immobilization   3. Pleuritic right sided chest/back pain, secondary to pulmonary infarct  4. right lower lobe pulmonary infarct  5. Bilateral lower lobe atelectasis  6. Mild hemoptysis, likely secondary to pulmonary infarct      other pertinent medical problems  6. Elevated transaminase  7. Asthma, exercise induced     Plan:    · Agree with DC. Discussed with Dr. Valadez. I fixed the order for Eliquis to be continued for 3 month at lest (Started dose already ordered)            Aníbal Reeder MD  01/09/19  4:52 PM          This note was dictated utilizing [employer] dictation

## 2019-01-09 NOTE — PROGRESS NOTES
Exercise Oximetry    Patient Name:Merari Bowen   MRN: [id number]   Date: 01/09/19             ROOM AIR BASELINE   SpO2% 92   Heart Rate 111   Blood Pressure      EXERCISE ON ROOM AIR SpO2% EXERCISE ON O2 @ LPM SpO2%   1 MINUTE 92 1 MINUTE    2 MINUTES 90 2 MINUTES    3 MINUTES 89 3 MINUTES    4 MINUTES 90 4 MINUTES    5 MINUTES 91 5 MINUTES    6 MINUTES  6 MINUTES               Distance Walked   Distance Walked   Dyspnea (Sandor Scale)   Dyspnea (Sandor Scale)   Fatigue (Sandor Scale)   Fatigue (Sandor Scale)   SpO2% Post Exercise   SpO2% Post Exercise   HR Post Exercise   HR Post Exercise   Time to Recovery   Time to Recovery     Comments: Pt's resting SPO2 on room air was 92%the patient was able to complete two laps  Around nursing station & maintain SPO2>88%.

## 2019-01-09 NOTE — PLAN OF CARE
Problem: Patient Care Overview  Goal: Plan of Care Review  Outcome: Ongoing (interventions implemented as appropriate)   01/09/19 3659   Coping/Psychosocial   Plan of Care Reviewed With patient   Plan of Care Review   Progress improving   OTHER   Outcome Summary patient still needs encouragement with respiratory exercises, using IS and improving slowly. ambulating with standby assistance. only reports pain in her head- migraine- at this time. will discharge on eliquis. Walking oximetry completed. patient likely DC today per A.

## 2019-01-09 NOTE — PROGRESS NOTES
[facility]  Inpatient Progress Note    Reason for Follow Up:  Follow-up elevated liver tests and constipation    Subjective     Interval History: Constipation resolved with stool softeners, liver tests are improved    Current Facility-Administered Medications:   •  apixaban (ELIQUIS) tablet 10 mg, 10 mg, Oral, Q12H, 10 mg at 01/09/19 0909 **FOLLOWED BY** [START ON 1/13/2019] apixaban (ELIQUIS) tablet 5 mg, 5 mg, Oral, Q12H, Aníbal Reeder MD  •  bisacodyl (DULCOLAX) EC tablet 5 mg, 5 mg, Oral, Daily PRN, Joshua Magana MD  •  budesonide (PULMICORT) nebulizer solution 1 mg, 1 mg, Nebulization, BID - RT, Andrew Valadez MD, 1 mg at 01/09/19 0717  •  buPROPion XL (WELLBUTRIN XL) 24 hr tablet 300 mg, 300 mg, Oral, Nightly, Joshua Magana MD, 300 mg at 01/08/19 2109  •  ipratropium-albuterol (DUO-NEB) nebulizer solution 3 mL, 3 mL, Nebulization, 4x Daily - RT, Andrew Valadez MD, 3 mL at 01/09/19 1218  •  methocarbamol (ROBAXIN) tablet 750 mg, 750 mg, Oral, TID PRN, Joshua Magana MD  •  ondansetron (ZOFRAN) tablet 4 mg, 4 mg, Oral, Q6H PRN **OR** ondansetron ODT (ZOFRAN-ODT) disintegrating tablet 4 mg, 4 mg, Oral, Q6H PRN **OR** ondansetron (ZOFRAN) injection 4 mg, 4 mg, Intravenous, Q6H PRN, Joshua Magana MD  •  oxyCODONE-acetaminophen (PERCOCET) 7.5-325 MG per tablet 1 tablet, 1 tablet, Oral, Q4H PRN, Joshua Magana MD, 1 tablet at 01/09/19 1044  •  polyethylene glycol (MIRALAX) powder 17 g, 17 g, Oral, BID, Andrew Valadez MD  •  sennosides-docusate sodium (SENOKOT-S) 8.6-50 MG tablet 2 tablet, 2 tablet, Oral, BID, Andrew Valadez MD, 2 tablet at 01/09/19 0909  •  sodium chloride 0.9 % flush 3 mL, 3 mL, Intravenous, Q12H, Joshua Magana MD, 3 mL at 01/09/19 0910  •  sodium chloride 0.9 % flush 3-10 mL, 3-10 mL, Intravenous, PRN, Joshua Magana MD, 3 mL at 01/08/19 1146  •  traZODone (DESYREL) tablet 50 mg, 50 mg, Oral, Nightly, Joshua Magana MD  •  venlafaxine (EFFEXOR)  tablet 37.5 mg, 37.5 mg, Oral, Nightly, Joshua Magana MD, 37.5 mg at 01/08/19 2110  Review of Systems:    She has weakness fatigue all other systems reviewed and negative    Objective     Vital Signs  Temp:  [97.7 °F (36.5 °C)-98.4 °F (36.9 °C)] 97.7 °F (36.5 °C)  Heart Rate:  [] 118  Resp:  [16-20] 16  BP: (122-136)/(78-82) 132/81  Body mass index is 29.8 kg/m².    Intake/Output Summary (Last 24 hours) at 1/9/2019 1347  Last data filed at 1/9/2019 0400  Gross per 24 hour   Intake 510 ml   Output 760 ml   Net -250 ml     No intake/output data recorded.     Physical Exam:   General: patient awake, alert and cooperative   Eyes: Normal lids and lashes, no scleral icterus   Neck: supple, normal ROM   Skin: warm and dry, not jaundiced   Cardiovascular: regular rhythm and rate, no murmurs auscultated   Pulm: clear to auscultation bilaterally, regular and unlabored   Abdomen: soft, nontender, nondistended; normal bowel sounds   Rectal: deferred   Extremities: no rash or edema   Psychiatric: Normal mood and behavior; memory intact     Results Review:     I reviewed the patient's new clinical results.    Results from last 7 days   Lab Units  01/09/19   0530  01/08/19   0526  01/06/19   0527   WBC 10*3/mm3  13.34*  12.34*  12.59*   HEMOGLOBIN g/dL  10.1*  10.0*  9.9*   HEMATOCRIT %  31.3*  31.0*  29.0*   PLATELETS 10*3/mm3  437  407  322     Results from last 7 days   Lab Units  01/09/19   0530  01/08/19   0526  01/07/19   0715   SODIUM mmol/L  138  136  138   POTASSIUM mmol/L  4.1  3.5  3.4*   CHLORIDE mmol/L  99  97*  99   CO2 mmol/L  29.3*  29.9*  28.0   BUN mg/dL  14  10  12   CREATININE mg/dL  0.65  0.58  0.60   CALCIUM mg/dL  9.5  9.1  9.0   BILIRUBIN mg/dL  0.3  0.5  0.6   ALK PHOS U/L  269*  310*  337*   ALT (SGPT) U/L  150*  221*  321*   AST (SGOT) U/L  27  41*  97*   GLUCOSE mg/dL  100*  103*  106*     Results from last 7 days   Lab Units  01/08/19   0949  01/07/19   0715  01/06/19   0527   INR   1.40*  1.45*   1.24*     Lab Results   Lab Value Date/Time    LIPASE 33 03/12/2015 1405       Radiology:  XR Chest 1 View   Final Result      CT Abdomen Pelvis With & Without Contrast   Final Result   IMPRESSION :    1. Pulmonary findings as discussed. These will require follow-up.   2. Constipation without obstruction.   3. Right adnexal cyst, outpatient ultrasound follow-up recommended to   ensure resolution.                            This report was finalized on 1/6/2019 4:55 AM by Min Flowers M.D.          US Gallbladder   Final Result   1. Unremarkable right upper quadrant ultrasound.       This report was finalized on 1/5/2019 7:11 PM by Min Flowers M.D.              Assessment/Plan     Patient Active Problem List   Diagnosis   • Atopic dermatitis   • Carpal tunnel syndrome   • Dyslipidemia   • Fatigue   • Cervical pain   • Vitamin D deficiency   • Depression   • Pulmonary emboli (CMS/HCC)   • Acute deep vein thrombosis (DVT) of distal vein of left lower extremity (CMS/HCC)   • Elevated liver function tests   • Hypoxia   • Pleural effusion     Assessment-  1.  Acute elevation in liver enzymes: rapidly improving, consistent with acute shock liver.  Normal liver on imaging, normal tylenol level, normal portal duplex US  2.  Acute pulmonary embolism-following recent surgery, on eliquis  3. Acute on chronic constipation: no BM in 10 days, stool burden on CT imaging     Plan  Daily CMP, anticipated continued improvement, we are agreeable to discharge home, she can have liver numbers checked 2 weeks as an outpatient  Bowel regimen in the form of stool softener, she does not want laxatives,  her constipation is resolving  We agree with discharge home   We will see as needed        I discussed the patients findings and my recommendations with patient, family and nursing staff.    Davonte Stover MD

## 2019-01-09 NOTE — PROGRESS NOTES
Continued Stay Note  [facility]     Patient Name: Merari Bowen  MRN: [id number]  Today's Date: 1/9/2019    Admit Date: 1/5/2019    Discharge Plan     Row Name 01/09/19 1252       Plan    Plan  Home with family    Patient/Family in Agreement with Plan  yes    Plan Comments  Pt confirms plan to return home with family.  [employer] to provide O2.  TED Sandy RN        Discharge Codes    No documentation.             Candida Sandy

## 2019-01-09 NOTE — PROGRESS NOTES
Continued Stay Note  [facility]     Patient Name: Merari Bowen  MRN: [id number]  Today's Date: 1/9/2019    Admit Date: 1/5/2019    Discharge Plan     Row Name 01/09/19 1556       Plan    Plan  Home with family    Patient/Family in Agreement with Plan  yes    Plan Comments  Today's walking oximetry indicated no O2 need. [facility] (Zandra) aware. Maricel Chang LCSW        Discharge Codes    No documentation.       Expected Discharge Date and Time     Expected Discharge Date Expected Discharge Time    Jan 9, 2019             Sophie Chang LCSW

## 2019-01-09 NOTE — NURSING NOTE
Patient eliquis dose to be adjusted to reflect 3 months of therapy on discharge paperwork.   Discharge delayed.

## 2019-01-09 NOTE — PLAN OF CARE
Problem: Patient Care Overview  Goal: Plan of Care Review  Outcome: Ongoing (interventions implemented as appropriate)   01/09/19 0526   Coping/Psychosocial   Plan of Care Reviewed With patient;spouse   Plan of Care Review   Progress improving   OTHER   Outcome Summary VSS, pt states that pain is well controlled on PO PRN pain medication. Encouraged TCDB, IS and ambulation throughout the shift. Pt able to ambulate X2 around nurses station. Pt able to achieve 1000 on IS. O2 at 1L per nasal cannula. Family at bedside. Will continue to monitor.       Problem: Fall Risk (Adult)  Goal: Identify Related Risk Factors and Signs and Symptoms  Outcome: Ongoing (interventions implemented as appropriate)   01/09/19 0526   Fall Risk (Adult)   Related Risk Factors (Fall Risk) gait/mobility problems;environment unfamiliar   Signs and Symptoms (Fall Risk) presence of risk factors     Goal: Absence of Fall  Outcome: Ongoing (interventions implemented as appropriate)   01/09/19 0526   Fall Risk (Adult)   Absence of Fall making progress toward outcome       Problem: VTE, DVT and PE (Adult)  Goal: Signs and Symptoms of Listed Potential Problems Will be Absent, Minimized or Managed (VTE, DVT and PE)  Outcome: Ongoing (interventions implemented as appropriate)   01/09/19 0526   Goal/Outcome Evaluation   Problems Assessed (VTE, DVT, PE) all   Problems Present (VTE, DVT, PE) deep vein thrombosis;pulmonary embolism       Problem: Skin Injury Risk (Adult)  Goal: Identify Related Risk Factors and Signs and Symptoms  Outcome: Ongoing (interventions implemented as appropriate)   01/09/19 0526   Skin Injury Risk (Adult)   Related Risk Factors (Skin Injury Risk) critical care admission;mobility impaired     Goal: Skin Health and Integrity  Outcome: Ongoing (interventions implemented as appropriate)   01/09/19 0526   Skin Injury Risk (Adult)   Skin Health and Integrity making progress toward outcome

## 2019-01-09 NOTE — PROGRESS NOTES
"                                              LOS: 3 days   Patient Care Team:  Adrian Metcalf MD as PCP - General (Family Medicine)  Chandan Rubio DO as PCP - Family Medicine    Chief Complaint:  Follow-up on PE, dyspnea and pleuritic chest pain and medical problems listed below    Interval History:   Dyspnea improved today after nebs and diuresis.     Ventilator/Non-Invasive Ventilation Settings (From admission, onward)    None            Vital Signs  Temp:  [97.8 °F (36.6 °C)-98.4 °F (36.9 °C)] 97.8 °F (36.6 °C)  Heart Rate:  [] 100  Resp:  [16] 16  BP: (122-136)/(74-82) 126/82    Intake/Output Summary (Last 24 hours) at 1/8/2019 2358  Last data filed at 1/8/2019 2201  Gross per 24 hour   Intake 790 ml   Output 2160 ml   Net -1370 ml     Flowsheet Rows      First Filed Value   Admission Height  172.7 cm (68\") Documented at 01/05/2019 0901   Admission Weight  84.2 kg (185 lb 10 oz) Documented at 01/05/2019 0901          Physical Exam:   General Appearance:    Alert, cooperative, in no acute distress   HEENT:  Mallampati score 3, moist mucous membrane   Neck:   Large. Trachea midline. No thyromegaly.   Lungs:     Very diminished air entry bilaterally due to poor inspiratory effort.  Bilateral basal crackles.  No wheezing     Heart:    Regular rhythm and normal rate, normal S1 and S2, no            murmur   Skin:    No abnormalities observed   Abdomen:     Obese. Soft. No tenderness. No HSM.   Neuro:   Conscious, alert, oriented x3   Extremities:   Moves all extremities well, no edema, no cyanosis, no             Redness          Results Review:        Results from last 7 days   Lab Units  01/08/19   0526  01/07/19   0715  01/06/19   0527   SODIUM mmol/L  136  138  136   POTASSIUM mmol/L  3.5  3.4*  3.5   CHLORIDE mmol/L  97*  99  99   CO2 mmol/L  29.9*  28.0  26.0   BUN mg/dL  10  12  12   CREATININE mg/dL  0.58  0.60  0.54*   GLUCOSE mg/dL  103*  106*  113*   CALCIUM mg/dL  9.1  9.0  8.7      "    Results from last 7 days   Lab Units  01/08/19   0526  01/06/19   0527  01/05/19   1006   WBC 10*3/mm3  12.34*  12.59*  13.32*   HEMOGLOBIN g/dL  10.0*  9.9*  10.6*   HEMATOCRIT %  31.0*  29.0*  31.1*   PLATELETS 10*3/mm3  407  322  294     Results from last 7 days   Lab Units  01/08/19   0949  01/07/19   0715  01/06/19   0527  01/05/19   1744  01/05/19   1409   INR   1.40*  1.45*  1.24*   --    --    APTT seconds   --    --   90.2*  83.6*  96.6*     Results from last 7 days   Lab Units  01/05/19   1409   PROBNP pg/mL  33.5       I reviewed the patient's new clinical results.  I personally viewed and interpreted the patient's CXR        Medication Review:     apixaban 10 mg Oral Q12H   Followed by      [START ON 1/13/2019] apixaban 5 mg Oral Q12H   budesonide 1 mg Nebulization BID - RT   buPROPion  mg Oral Nightly   ipratropium-albuterol 3 mL Nebulization 4x Daily - RT   polyethylene glycol 17 g Oral BID   sennosides-docusate sodium 2 tablet Oral BID   sodium chloride 3 mL Intravenous Q12H   traZODone 50 mg Oral Nightly   venlafaxine 37.5 mg Oral Nightly          Reviewed the echocardiogram report dated 1/5/19.  Poor window.  No pulmonary hypertension    Assessment:     1. Acute right lower lobe segmental pulmonary embolism  2. Acute left lower extremity deep, gastrocnemius/soleal, likely provoked by recent surgery and immobilization   3. Pleuritic right sided chest/back pain, secondary to pulmonary infarct  4. right lower lobe pulmonary infarct  5. Bilateral lower lobe atelectasis  6. Mild hemoptysis, likely secondary to pulmonary infarct      other pertinent medical problems  6. Elevated transaminase  7. Asthma, exercise induced     Plan:    · Continue IS. Doing better with it today  · Agree with inhaled steroid for cough and diuresis. Discussed with Dr. Valadez. Patient seems to be improving nad hoping she can go home tomorrow. I encouraged her to ambulate and use the IS.  · Continue Eliquis.                  Aníbal Reeder MD  01/08/19  11:58 PM          This note was dictated utilizing Dragon dictation

## 2019-01-09 NOTE — DISCHARGE SUMMARY
[facility]    Date of Discharge:  1/9/2019    PCP: Adrian Metcalf MD    Discharge Diagnosis:   Active Hospital Problems    Diagnosis Date Noted   • **Pulmonary emboli (CMS/HCC) [I26.99] 01/05/2019   • Pleural effusion [J90] 01/08/2019   • Hypoxia [R09.02] 01/07/2019   • Acute deep vein thrombosis (DVT) of distal vein of left lower extremity (CMS/HCC) [I82.4Z2] 01/05/2019   • Elevated liver function tests [R94.5] 01/05/2019   • Depression [F32.9] 01/03/2017   • Dyslipidemia [E78.5] 03/09/2016      Resolved Hospital Problems   No resolved problems to display.     Procedures Performed       Consults     Date and Time Order Name Status Description    1/5/2019 1719 Inpatient Gastroenterology Consult Completed     1/5/2019 1719 Inpatient Pulmonology Consult Completed         Hospital Course  Please see history and physical for details. Patient is a 44 y.o. female initially admitted for chest pain or shortness of breath.  She had under gone recent plastic surgery involving a tummy tuck and breast augmentation.  As result of postoperative complications she developed lower extremity DVT which ultimately resulted in pulmonary embolism.  She required supplemental oxygen but that has been weaned to room air.  She has been transition towards Eliquis and has tolerated without any bleeding issues.  As result of pulmonary strain patient went into flash pulmonary edema and responded well to IV Lasix.  As a result she's now been weaned to room air and she is breathing much better without bronchospasm and much improved cough.  Acute pulmonary edema explains passive congestion which led to elevation of liver function which has responded well.  She has chronic issues of constipation and has had a bowel movement just yesterday with use of twice daily MiraLAX twice daily senna S.  I counseled patient in regards to further use of senna S post discharge given the fact that  she still requires pain medication in a postoperative state.  Case also discussed with pulmonology and they recommended continuation of incentive spirometry and otherwise both consults are also okay with disposition to home.  All questions are been answered to both patient and spouse and they're very much amenable to the above plan regarding disposition to home.  No additional discharge needs are noted      Condition on Discharge: Improved.     Temp:  [97.7 °F (36.5 °C)-98.4 °F (36.9 °C)] 97.7 °F (36.5 °C)  Heart Rate:  [] 118  Resp:  [16-20] 16  BP: (122-132)/(78-82) 132/81  Body mass index is 29.8 kg/m².    Physical Exam   Constitutional: She is oriented to person, place, and time. She appears well-developed and well-nourished.   HENT:   Head: Normocephalic.   Neck: Neck supple. No JVD present.   Cardiovascular: Normal rate and regular rhythm.   Pulmonary/Chest: Effort normal. No respiratory distress.   Very minimal bibasilar rales with significant improvement as she is no longer having any labored breathing   Abdominal: Soft. Bowel sounds are normal.   Musculoskeletal: She exhibits no edema.   Neurological: She is alert and oriented to person, place, and time. No cranial nerve deficit.   Psychiatric: She has a normal mood and affect. Her behavior is normal. Thought content normal.        Discharge Medications      New Medications      Instructions Start Date   apixaban 5 MG tablet tablet  Commonly known as:  ELIQUIS   10 mg, Oral, Every 12 Hours Scheduled      sennosides-docusate sodium 8.6-50 MG tablet  Commonly known as:  SENOKOT-S   2 tablets, Oral, 2 Times Daily         Changes to Medications      Instructions Start Date   buPROPion  MG 24 hr tablet  Commonly known as:  WELLBUTRIN XL  What changed:    · how much to take  · additional instructions  · Another medication with the same name was removed. Continue taking this medication, and follow the directions you see here.   300 mg, Oral, Daily, -  Please call the office for an appt.      pravastatin 10 MG tablet  Commonly known as:  PRAVACHOL  What changed:  when to take this   10 mg, Oral, Daily         Continue These Medications      Instructions Start Date   atorvastatin 40 MG tablet  Commonly known as:  LIPITOR   TAKE 1 TABLET BY MOUTH DAILY.      bisacodyl 5 MG EC tablet  Commonly known as:  DULCOLAX   5 mg, Oral, Daily PRN      cetirizine 10 MG tablet  Commonly known as:  zyrTEC   10 mg, Oral, Daily      docusate sodium 100 MG capsule  Commonly known as:  COLACE   100 mg, Oral, 2 Times Daily      methocarbamol 750 MG tablet  Commonly known as:  ROBAXIN   750 mg, Oral, 3 Times Daily      omeprazole 40 MG capsule  Commonly known as:  priLOSEC   TAKE 1 CAPSULE BY MOUTH DAILY      ondansetron 8 MG tablet  Commonly known as:  ZOFRAN   Oral, Every 8 Hours PRN      oxyCODONE-acetaminophen 7.5-325 MG per tablet  Commonly known as:  PERCOCET   1 tablet, Oral, Every 6 Hours PRN      PROAIR RESPICLICK IN   Inhalation, Every 6 Hours PRN      SUMAtriptan 100 MG tablet  Commonly known as:  IMITREX   100 mg, Oral, Every 2 Hours PRN      traZODone 50 MG tablet  Commonly known as:  DESYREL   50 mg, Oral, Nightly      venlafaxine 37.5 MG tablet  Commonly known as:  EFFEXOR   37.5 mg, Oral, Daily      vitamin C 250 MG tablet  Commonly known as:  ASCORBIC ACID   250 mg, Oral, Daily              Additional Instructions for the Follow-ups that You Need to Schedule     Discharge Follow-up with PCP   As directed       Currently Documented PCP:    Adrian Metcalf MD    PCP Phone Number:    [phone]     Follow Up Details:  pcp prn - pulm per recs - GI prn - keep previously scheduled surgical follow-up           Follow-up Information     Adrian Metcalf MD .    Specialty:  Family Medicine  Why:  pcp prn - pulm per recs - GI prn - keep previously scheduled surgical follow-up  Contact information:  [address]  [id number] [phone]             Chandan Rubio DO .    Specialties:  Family Medicine, Emergency Medicine  Why:  pcp prn - pulm per recs - GI prn - keep previously scheduled surgical follow-up  Contact information:  [address]  [address] [id number] [phone]                 Test Results Pending at Discharge     Andrew Valadez MD  01/09/19  3:24 PM    Discharge time spent greater than 30 minutes.

## 2019-01-10 NOTE — PAYOR COMM NOTE
"DISCHARGED        Merari Fernandez (44 y.o. Female)     Date of Birth Social Security Number Address Home Phone MRN    [id number]  [address] [address] [phone] [id number]    [facility] Status          Denominational        Admission Date Admission Type Admitting Provider Attending Provider Department, Room/Bed    1/5/19 Urgent Joshua Magana MD  [address], [address]/1    Discharge Date Discharge Disposition Discharge Destination        1/9/2019 Home or Self Care              Attending Provider:  (none)   Allergies:  Chlorcyclizine-pseudoephed, Cimetidine, Diclofenac, Peanut-containing Drug Products    Isolation:  None   Infection:  None   Code Status:  Prior    Ht:  172.7 cm (68\")   Wt:  88.9 kg (196 lb)    Admission Cmt:  None   Principal Problem:  Pulmonary emboli (CMS/HCC) [I26.99]                 Active Insurance as of 1/5/2019     Primary Coverage     Payor Plan Insurance Group Employer/Plan Group    [employer] [facility] [employer] PPO [id number]     Payor Plan Address Payor Plan Phone Number Payor Plan Fax Number Effective Dates    [address] [phone]  6/1/2014 - None Entered    [place] [address]       Subscriber Name Subscriber Birth Date Member ID       MONO FERNANDEZ [birth year]1968 [id number]                 Emergency Contacts      (Rel.) Home Phone Work Phone Mobile Phone    Mono Fernandez (Spouse) [phone] -- --               Discharge Summary      Andrew Valadez MD at 1/9/2019  3:23 PM                              [facility]               [facility]    Date of Discharge:  1/9/2019    PCP: Adrian Metcalf MD    Discharge Diagnosis:   Active Hospital Problems    Diagnosis Date Noted   • **Pulmonary emboli (CMS/HCC) [I26.99] 01/05/2019   • Pleural effusion [J90] 01/08/2019   • Hypoxia [R09.02] 01/07/2019   • Acute deep vein thrombosis (DVT) of distal vein of left lower extremity " (CMS/[facility]) [I82.4Z2] 01/05/2019   • Elevated liver function tests [R94.5] 01/05/2019   • Depression [F32.9] 01/03/2017   • Dyslipidemia [E78.5] 03/09/2016      Resolved Hospital Problems   No resolved problems to display.     Procedures Performed       Consults     Date and Time Order Name Status Description    1/5/2019 1719 Inpatient Gastroenterology Consult Completed     1/5/2019 1719 Inpatient Pulmonology Consult Completed         Hospital Course  Please see history and physical for details. Patient is a 44 y.o. female initially admitted for chest pain or shortness of breath.  She had under gone recent plastic surgery involving a tummy tuck and breast augmentation.  As result of postoperative complications she developed lower extremity DVT which ultimately resulted in pulmonary embolism.  She required supplemental oxygen but that has been weaned to room air.  She has been transition towards Eliquis and has tolerated without any bleeding issues.  As result of pulmonary strain patient went into flash pulmonary edema and responded well to IV Lasix.  As a result she's now been weaned to room air and she is breathing much better without bronchospasm and much improved cough.  Acute pulmonary edema explains passive congestion which led to elevation of liver function which has responded well.  She has chronic issues of constipation and has had a bowel movement just yesterday with use of twice daily MiraLAX twice daily senna S.  I counseled patient in regards to further use of senna S post discharge given the fact that she still requires pain medication in a postoperative state.  Case also discussed with pulmonology and they recommended continuation of incentive spirometry and otherwise both consults are also okay with disposition to home.  All questions are been answered to both patient and spouse and they're very much amenable to the above plan regarding disposition to home.  No additional discharge needs are  noted      Condition on Discharge: Improved.     Temp:  [97.7 °F (36.5 °C)-98.4 °F (36.9 °C)] 97.7 °F (36.5 °C)  Heart Rate:  [] 118  Resp:  [16-20] 16  BP: (122-132)/(78-82) 132/81  Body mass index is 29.8 kg/m².    Physical Exam   Constitutional: She is oriented to person, place, and time. She appears well-developed and well-nourished.   HENT:   Head: Normocephalic.   Neck: Neck supple. No JVD present.   Cardiovascular: Normal rate and regular rhythm.   Pulmonary/Chest: Effort normal. No respiratory distress.   Very minimal bibasilar rales with significant improvement as she is no longer having any labored breathing   Abdominal: Soft. Bowel sounds are normal.   Musculoskeletal: She exhibits no edema.   Neurological: She is alert and oriented to person, place, and time. No cranial nerve deficit.   Psychiatric: She has a normal mood and affect. Her behavior is normal. Thought content normal.        Discharge Medications      New Medications      Instructions Start Date   apixaban 5 MG tablet tablet  Commonly known as:  ELIQUIS   10 mg, Oral, Every 12 Hours Scheduled      sennosides-docusate sodium 8.6-50 MG tablet  Commonly known as:  SENOKOT-S   2 tablets, Oral, 2 Times Daily         Changes to Medications      Instructions Start Date   buPROPion  MG 24 hr tablet  Commonly known as:  WELLBUTRIN XL  What changed:    · how much to take  · additional instructions  · Another medication with the same name was removed. Continue taking this medication, and follow the directions you see here.   300 mg, Oral, Daily, - Please call the office for an appt.      pravastatin 10 MG tablet  Commonly known as:  PRAVACHOL  What changed:  when to take this   10 mg, Oral, Daily         Continue These Medications      Instructions Start Date   atorvastatin 40 MG tablet  Commonly known as:  LIPITOR   TAKE 1 TABLET BY MOUTH DAILY.      bisacodyl 5 MG EC tablet  Commonly known as:  DULCOLAX   5 mg, Oral, Daily PRN       cetirizine 10 MG tablet  Commonly known as:  zyrTEC   10 mg, Oral, Daily      docusate sodium 100 MG capsule  Commonly known as:  COLACE   100 mg, Oral, 2 Times Daily      methocarbamol 750 MG tablet  Commonly known as:  ROBAXIN   750 mg, Oral, 3 Times Daily      omeprazole 40 MG capsule  Commonly known as:  priLOSEC   TAKE 1 CAPSULE BY MOUTH DAILY      ondansetron 8 MG tablet  Commonly known as:  ZOFRAN   Oral, Every 8 Hours PRN      oxyCODONE-acetaminophen 7.5-325 MG per tablet  Commonly known as:  PERCOCET   1 tablet, Oral, Every 6 Hours PRN      PROAIR RESPICLICK IN   Inhalation, Every 6 Hours PRN      SUMAtriptan 100 MG tablet  Commonly known as:  IMITREX   100 mg, Oral, Every 2 Hours PRN      traZODone 50 MG tablet  Commonly known as:  DESYREL   50 mg, Oral, Nightly      venlafaxine 37.5 MG tablet  Commonly known as:  EFFEXOR   37.5 mg, Oral, Daily      vitamin C 250 MG tablet  Commonly known as:  ASCORBIC ACID   250 mg, Oral, Daily              Additional Instructions for the Follow-ups that You Need to Schedule     Discharge Follow-up with PCP   As directed       Currently Documented PCP:    Adrian Metcalf MD    PCP Phone Number:    [phone]     Follow Up Details:  pcp prn - pulm per recs - GI prn - keep previously scheduled surgical follow-up           Follow-up Information     Adrian Metcalf MD .    Specialty:  Family Medicine  Why:  pcp prn - pulm per recs - GI prn - keep previously scheduled surgical follow-up  Contact information:  [address]  [facility] [address]  [place] [id number] [phone]             Chandan Rubio DO .    Specialties:  Family Medicine, Emergency Medicine  Why:  pcp prn - pulm per recs - GI prn - keep previously scheduled surgical follow-up  Contact information:  [address]  [facility] [address]  [place] [id number] [phone]                 Test Results Pending at Discharge     Andrew Valadez MD  01/09/19  3:24 PM    Discharge time spent greater than 30  minutes.    Electronically signed by Andrew Valadez MD at 1/9/2019  3:27 PM

## 2019-01-10 NOTE — PROGRESS NOTES
Case Management Discharge Note    Final Note: Pt discharged home, no known needs.  TED morales RN    Destination      No service has been selected for the patient.      Durable Medical Equipment      No service has been selected for the patient.      Dialysis/Infusion      No service has been selected for the patient.      Home Medical Care      No service has been selected for the patient.      Community Resources      No service has been selected for the patient.        Other: Other(private auto)    Final Discharge Disposition Code: 01 - home or self-care

## 2025-05-13 ENCOUNTER — OFFICE VISIT (OUTPATIENT)
Dept: GASTROENTEROLOGY | Facility: CLINIC | Age: 50
End: 2025-05-13
Payer: COMMERCIAL

## 2025-05-13 ENCOUNTER — PREP FOR SURGERY (OUTPATIENT)
Dept: SURGERY | Facility: SURGERY CENTER | Age: 50
End: 2025-05-13
Payer: COMMERCIAL

## 2025-05-13 VITALS
HEIGHT: 68 IN | SYSTOLIC BLOOD PRESSURE: 124 MMHG | BODY MASS INDEX: 31.52 KG/M2 | DIASTOLIC BLOOD PRESSURE: 60 MMHG | WEIGHT: 208 LBS

## 2025-05-13 DIAGNOSIS — Z87.19 H/O DIVERTICULITIS OF COLON: ICD-10-CM

## 2025-05-13 DIAGNOSIS — K58.1 IRRITABLE BOWEL SYNDROME WITH CONSTIPATION: ICD-10-CM

## 2025-05-13 DIAGNOSIS — Z12.11 ENCOUNTER FOR SCREENING COLONOSCOPY: Primary | ICD-10-CM

## 2025-05-13 DIAGNOSIS — R10.84 GENERALIZED ABDOMINAL PAIN: ICD-10-CM

## 2025-05-13 DIAGNOSIS — K58.1 IRRITABLE BOWEL SYNDROME WITH CONSTIPATION: Primary | ICD-10-CM

## 2025-05-13 RX ORDER — SODIUM CHLORIDE 0.9 % (FLUSH) 0.9 %
10 SYRINGE (ML) INJECTION AS NEEDED
OUTPATIENT
Start: 2025-05-13

## 2025-05-13 RX ORDER — VALACYCLOVIR HYDROCHLORIDE 1 G/1
1000 TABLET, FILM COATED ORAL AS NEEDED
COMMUNITY

## 2025-05-13 RX ORDER — LABETALOL 200 MG/1
200 TABLET, FILM COATED ORAL 2 TIMES DAILY
COMMUNITY
Start: 2024-12-12

## 2025-05-13 RX ORDER — DICYCLOMINE HYDROCHLORIDE 10 MG/1
10 CAPSULE ORAL 3 TIMES DAILY PRN
Qty: 90 CAPSULE | Refills: 5 | Status: SHIPPED | OUTPATIENT
Start: 2025-05-13 | End: 2025-05-13 | Stop reason: SDUPTHER

## 2025-05-13 RX ORDER — SODIUM CHLORIDE 0.9 % (FLUSH) 0.9 %
3 SYRINGE (ML) INJECTION EVERY 12 HOURS SCHEDULED
OUTPATIENT
Start: 2025-05-13

## 2025-05-13 RX ORDER — DICYCLOMINE HYDROCHLORIDE 10 MG/1
10 CAPSULE ORAL 3 TIMES DAILY PRN
Qty: 90 CAPSULE | Refills: 5 | Status: SHIPPED | OUTPATIENT
Start: 2025-05-13

## 2025-05-13 RX ORDER — SODIUM CHLORIDE, SODIUM LACTATE, POTASSIUM CHLORIDE, CALCIUM CHLORIDE 600; 310; 30; 20 MG/100ML; MG/100ML; MG/100ML; MG/100ML
30 INJECTION, SOLUTION INTRAVENOUS CONTINUOUS PRN
OUTPATIENT
Start: 2025-05-13 | End: 2025-05-14

## 2025-05-13 NOTE — PATIENT INSTRUCTIONS
Schedule colonoscopy, orders placed.    Follow-up visit after colonoscopy to review findings and make additional recommendations if needed.     For constipation:  Begin taking ibsrela samples one tablet daily x 7 days, can increase to twice daily with meals if needed after that time       For abdominal cramping:   a prescription for dicyclomine has been sent to your pharmacy.  You may take 1 tablet up to 3 times daily as needed for symptom management.    This medication does work well if taken 1 hour before meals to help prevent the fecal urgency that can occur after eating.  However can also be taken up to 3 times daily as needed for treatment of abdominal cramping  This can cause constipation and if occurs please reduce dosing        For Constipation:    Goal of constipation regimen is to produce at least 3 complete bowel movements per week   Recommend starting a soluble fiber regimen that includes psyllium such as Metamucil.    This helps to keep stool soft and formed and easy transit throughout the colon to produce regular and complete bowel movements.  Consume at least 20 to 30 g of dietary fiber per day  Research has proven that consuming 5 prunes or 2 kiwi fruit per day can also aid in reducing constipation.  When starting fiber regimen recommend starting with a low-dose and gradually increasing by 1 tablespoon every 7 days as tolerated.    This will help your body acclimate to the higher fiber diet and reduce risk of unwanted side effects that include bloating, gas, abdominal fullness, and cramping  For example: Begin taking 1 tablespoon daily for at least 7 days, if this is not successful in producing regular bowel movements can begin taking 1 tablespoons twice a day, and finally if this is not successful after taking 2 tablespoons for 7 days, can further increase to maximum recommended dosing of 1 tablespoon 3 times per day.  If you choose capsule formulation:  Begin taking 3 capsules daily in the morning  with at least 8 ounces of water, then increase by 1 capsule every 7 days until stool becomes soft and bowel movements are complete and sensation.  Do not exceed maximum package dosing  It is important to consume increased amounts of fluid throughout the day to help improve the effectiveness of the fiber supplementation  Avoid gummy formulations of fiber as this can further increase your risk of the above adverse effects due to artificial sweeteners in the Gummies.

## 2025-05-13 NOTE — TELEPHONE ENCOUNTER
PT WAS JUST SEEN DICYCLOMINE WAS SENT IN TO WRONG PHARMACY SHE IS AT [facility] ON [address] PLEASE SEND SCRIPT THERE

## 2025-05-13 NOTE — PROGRESS NOTES
Patient or patient representative verbalized consent for the use of Ambient Listening during the visit with  RANDOLPH Pan for chart documentation. 5/13/2025  10:33 EDT    Chief Complaint   Patient presents with    Constipation    Abdominal Pain    Nausea    Bloated    Food Intolerance         Patient is a 50 y.o. who presents to the office as a new patient for evaluation of constipation, diverticulitis, and blood in the stool patient has a significant past medical history of asthma,  constipation and history of complicated diverticulitis-requiring hospitalization to [facility].    9/2020 colonoscopy at OSH:  Unremarkable except for diverticulosis in left colon    Past Surgical History   Cholecystectomy  4/25/2024-percutaneous drain for treatment of perforated diverticulitis    Social History  Social alcohol consumption  Denies use of illicit drugs or tobacco    Family History  Denies known family history of colon cancer, colon polyps, or IBD    History of Present Illness  The patient presents for evaluation of abdominal pain.    Abdominal Pain  - Experienced recurrent episodes of diverticulitis since April 2024, necessitating drainage procedures but not surgical intervention.  - Initial gastrointestinal symptoms commenced in 2023, characterized by persistent abdominal discomfort, which intensified in 2024.  - Reports intermittent left upper quadrant pain, occasional abdominal rigidity and distension, and muscle spasms during defecation, described as resembling a charley horse.  - Muscle spasms have occurred twice, with the second episode being more severe and associated with a bowel movement.  Denies melena or hematochezia.  - Reports infrequent bowel movements, occurring every 2 to 3 days, which may correlate with the frequency of symptoms.  - Bloating is noted to be non-diet-related.  - Dietary modifications, including the consumption of bland foods, yogurt, cottage cheese, crackers, gluten-free  protein bars, and a daily fiber supplement, have provided some symptomatic relief.  - Denies experiencing heartburn, nausea, vomiting, dysphagia, or unintentional weight loss.    Supplemental information:     The patient plans to travel [place] at the end of July 2025 and seeks resolution of symptoms prior to departure. The current medication regimen includes daily Effexor, with the recent discontinuation of dicyclomine 20 mg from the emergency room due to uncertainty regarding its indication.    SOCIAL HISTORY  - Diet: [place] diet including yogurt, cottage cheese, crackers, gluten-free items, and protein bars.       Current/Previous treatment for abdominal pain  Current:  none  Previous:  Dicyclomine 20 mg 4 times daily initiated after ER evaluation on 4/16/2025      Medications    Current Outpatient Medications:     Albuterol Sulfate (PROAIR RESPICLICK IN), Inhale every 6 (six) hours as needed., Disp: , Rfl:     atorvastatin (LIPITOR) 40 MG tablet, TAKE 1 TABLET BY MOUTH DAILY., Disp: 30 tablet, Rfl: 0    cetirizine (ZyrTEC) 10 MG tablet, Take 1 tablet by mouth Daily., Disp: , Rfl:     labetalol (NORMODYNE) 200 MG tablet, Take 1 tablet by mouth 2 (Two) Times a Day., Disp: , Rfl:     SUMAtriptan (IMITREX) 100 MG tablet, Take one tablet at onset of headache. May repeat dose one time in 2 hours if headache not relieved., Disp: 9 tablet, Rfl: 5    valACYclovir (VALTREX) 1000 MG tablet, Take 1 tablet by mouth As Needed., Disp: , Rfl:     venlafaxine XR (EFFEXOR XR) 37.5 MG 24 hr capsule, Take 1 capsule by mouth once daily, Disp: 90 capsule, Rfl: 3    dicyclomine (BENTYL) 10 MG capsule, Take 1 capsule by mouth 3 (Three) Times a Day As Needed (abdominal pain/diarrhea)., Disp: 90 capsule, Rfl: 5    Tenapanor HCl 50 MG tablet, Take 1 tablet by mouth 2 (Two) Times a Day With Meals., Disp: 18 tablet, Rfl: 0  Result Review :        US Gallbladder (01/05/2019 18:28)  CT Abdomen Pelvis With & Without Contrast (01/05/2019  "20:17)  Duplex Portal Hepatic Complete CAR (01/06/2019 11:09)  CT Abdomen Pelvis With Contrast (04/16/2025 14:04)   Sigmoid diverticulosis without active diverticulitis  Hiatal hernia  Vital Signs:   /60 (BP Location: Right arm, Patient Position: Sitting, Cuff Size: Adult)   Ht 172.7 cm (67.99\")   Wt 94.3 kg (208 lb)   BMI 31.63 kg/m²     Body mass index is 31.63 kg/m².      Physical Exam  Constitutional:       General: She is not in acute distress.     Appearance: Normal appearance. She is not ill-appearing.   HENT:      Head: Normocephalic.   Eyes:      General: No scleral icterus.  Pulmonary:      Effort: No respiratory distress.   Abdominal:      General: Abdomen is flat. Bowel sounds are normal. There is no distension.      Palpations: Abdomen is soft. There is no mass.      Tenderness: There is abdominal tenderness. There is no guarding or rebound.      Hernia: No hernia is present.      Comments: Generalized abdominal tenderness with palpation.   Skin:     General: Skin is warm and dry.   Neurological:      Mental Status: She is alert.      Gait: Gait normal.   Psychiatric:         Mood and Affect: Mood normal.       Assessment and Plan    Diagnoses and all orders for this visit:    1. Irritable bowel syndrome with constipation (Primary)  -     Tenapanor HCl 50 MG tablet; Take 1 tablet by mouth 2 (Two) Times a Day With Meals.  Dispense: 18 tablet; Refill: 0  -     Discontinue: dicyclomine (BENTYL) 10 MG capsule; Take 1 capsule by mouth 3 (Three) Times a Day As Needed (abdominal pain/diarrhea).  Dispense: 90 capsule; Refill: 5    2. H/O diverticulitis of colon    3. Generalized abdominal pain       Assessment & Plan  Generalized abdominal pain:  - Patient describes correlation of current symptoms with bowel movements.  I am hopeful that abdominal pain will improve with bowel regularity given absence of alarm symptoms.  -However patient has not undergone colonoscopy evaluation since complicated episode " of diverticulitis in 2024.  I would like for her to proceed to assess for evidence of scad, scarring, or narrowing contributing to residual abdominal pain.  - Pathophysiology involves abnormal nerve signaling to the abdominal wall.  - She is agreeable with this plan and case request orders placed for colonoscopy.  Discussed procedural requirements for colonoscopy including bowel preparation along with Sedation will be used; a  is required.    IBS-C  - Start Ibsrela once daily with meals for 7 days; increase to twice daily if necessary to improve bowel frequency.  - Continue fiber supplement.  - Prescription for dicyclomine 10 mg for severe discomfort.  New prescription sent to pharmacy.  - Discontinue probiotic if no improvement after one month.  - Recommend also starting fiber supplementation with written instructions provided at time of today's visit.    Follow-up:  - 2 to 4 weeks.      Patient is agreeable to the outlined above treatment plan.  Verbalizes understanding and will contact office for any new or worsening concerns.  All questions answered and support provided.  Patient Instructions   Schedule colonoscopy, orders placed.    Follow-up visit after colonoscopy to review findings and make additional recommendations if needed.     For constipation:  Begin taking ibsrela samples one tablet daily x 7 days, can increase to twice daily with meals if needed after that time       For abdominal cramping:   a prescription for dicyclomine has been sent to your pharmacy.  You may take 1 tablet up to 3 times daily as needed for symptom management.    This medication does work well if taken 1 hour before meals to help prevent the fecal urgency that can occur after eating.  However can also be taken up to 3 times daily as needed for treatment of abdominal cramping  This can cause constipation and if occurs please reduce dosing        For Constipation:    Goal of constipation regimen is to produce at least 3  complete bowel movements per week   Recommend starting a soluble fiber regimen that includes psyllium such as Metamucil.    This helps to keep stool soft and formed and easy transit throughout the colon to produce regular and complete bowel movements.  Consume at least 20 to 30 g of dietary fiber per day  Research has proven that consuming 5 prunes or 2 kiwi fruit per day can also aid in reducing constipation.  When starting fiber regimen recommend starting with a low-dose and gradually increasing by 1 tablespoon every 7 days as tolerated.    This will help your body acclimate to the higher fiber diet and reduce risk of unwanted side effects that include bloating, gas, abdominal fullness, and cramping  For example: Begin taking 1 tablespoon daily for at least 7 days, if this is not successful in producing regular bowel movements can begin taking 1 tablespoons twice a day, and finally if this is not successful after taking 2 tablespoons for 7 days, can further increase to maximum recommended dosing of 1 tablespoon 3 times per day.  If you choose capsule formulation:  Begin taking 3 capsules daily in the morning with at least 8 ounces of water, then increase by 1 capsule every 7 days until stool becomes soft and bowel movements are complete and sensation.  Do not exceed maximum package dosing  It is important to consume increased amounts of fluid throughout the day to help improve the effectiveness of the fiber supplementation  Avoid gummy formulations of fiber as this can further increase your risk of the above adverse effects due to artificial sweeteners in the Gummies.      EMR Dragon/Transcription Disclaimer:  This document has been Dictated utilizing Dragon dictation.     Samia Singer, MSN, APRN, FNP-C   [facility]  Gastroenterology   [address] [facility]  [place] [place] [address]  [phone] office  [phone] fax

## 2025-05-16 ENCOUNTER — PATIENT ROUNDING (BHMG ONLY) (OUTPATIENT)
Dept: GASTROENTEROLOGY | Facility: CLINIC | Age: 50
End: 2025-05-16
Payer: COMMERCIAL

## 2025-05-17 NOTE — PROGRESS NOTES
[facility]STR[facility]OLOG[facility]  [address]  [facility] [address] [phone].    Before we get started may I verify your date of birth? 1975    I am calling to officially welcome you to our practice and ask about your recent visit. Is this a good time to talk? yes    Tell me about your visit with us. What things went well?  OSIRIS WENT GREAT THANK YOU       We're always looking for ways to make our patients' experiences even better. Do you have recommendations on ways we may improve?  no    Overall were you satisfied with your first visit to our practice? yes       I appreciate you taking the time to speak with me today. Is there anything else I can do for you? no      Thank you, and have a great day.

## 2025-05-28 ENCOUNTER — ANESTHESIA (OUTPATIENT)
Dept: SURGERY | Facility: SURGERY CENTER | Age: 50
End: 2025-05-28
Payer: COMMERCIAL

## 2025-05-28 ENCOUNTER — HOSPITAL ENCOUNTER (OUTPATIENT)
Facility: SURGERY CENTER | Age: 50
Setting detail: HOSPITAL OUTPATIENT SURGERY
Discharge: HOME | End: 2025-05-28
Attending: STUDENT IN AN ORGANIZED HEALTH CARE EDUCATION/TRAINING PROGRAM | Admitting: STUDENT IN AN ORGANIZED HEALTH CARE EDUCATION/TRAINING PROGRAM
Payer: COMMERCIAL

## 2025-05-28 ENCOUNTER — ANESTHESIA EVENT (OUTPATIENT)
Dept: SURGERY | Facility: SURGERY CENTER | Age: 50
End: 2025-05-28
Payer: COMMERCIAL

## 2025-05-28 VITALS
RESPIRATION RATE: 16 BRPM | OXYGEN SATURATION: 98 % | BODY MASS INDEX: 31.22 KG/M2 | SYSTOLIC BLOOD PRESSURE: 122 MMHG | HEIGHT: 68 IN | HEART RATE: 71 BPM | WEIGHT: 206 LBS | DIASTOLIC BLOOD PRESSURE: 78 MMHG | TEMPERATURE: 98 F

## 2025-05-28 DIAGNOSIS — Z87.19 H/O DIVERTICULITIS OF COLON: ICD-10-CM

## 2025-05-28 DIAGNOSIS — Z12.11 ENCOUNTER FOR SCREENING COLONOSCOPY: ICD-10-CM

## 2025-05-28 DIAGNOSIS — R10.84 GENERALIZED ABDOMINAL PAIN: ICD-10-CM

## 2025-05-28 PROCEDURE — 45378 DIAGNOSTIC COLONOSCOPY: CPT | Performed by: STUDENT IN AN ORGANIZED HEALTH CARE EDUCATION/TRAINING PROGRAM

## 2025-05-28 PROCEDURE — 25810000003 LACTATED RINGERS PER 1000 ML

## 2025-05-28 PROCEDURE — 25010000002 LIDOCAINE 2% SOLUTION: Performed by: NURSE ANESTHETIST, CERTIFIED REGISTERED

## 2025-05-28 PROCEDURE — 25010000002 PROPOFOL 10 MG/ML EMULSION: Performed by: NURSE ANESTHETIST, CERTIFIED REGISTERED

## 2025-05-28 RX ORDER — SODIUM CHLORIDE 0.9 % (FLUSH) 0.9 %
3 SYRINGE (ML) INJECTION EVERY 12 HOURS SCHEDULED
Status: DISCONTINUED | OUTPATIENT
Start: 2025-05-28 | End: 2025-05-28 | Stop reason: HOSPADM

## 2025-05-28 RX ORDER — SODIUM CHLORIDE, SODIUM LACTATE, POTASSIUM CHLORIDE, CALCIUM CHLORIDE 600; 310; 30; 20 MG/100ML; MG/100ML; MG/100ML; MG/100ML
30 INJECTION, SOLUTION INTRAVENOUS CONTINUOUS PRN
Status: DISCONTINUED | OUTPATIENT
Start: 2025-05-28 | End: 2025-05-28 | Stop reason: HOSPADM

## 2025-05-28 RX ORDER — LIDOCAINE HYDROCHLORIDE 20 MG/ML
INJECTION, SOLUTION INFILTRATION; PERINEURAL AS NEEDED
Status: DISCONTINUED | OUTPATIENT
Start: 2025-05-28 | End: 2025-05-28 | Stop reason: SURG

## 2025-05-28 RX ORDER — PROPOFOL 10 MG/ML
VIAL (ML) INTRAVENOUS CONTINUOUS PRN
Status: DISCONTINUED | OUTPATIENT
Start: 2025-05-28 | End: 2025-05-28 | Stop reason: SURG

## 2025-05-28 RX ORDER — SODIUM CHLORIDE 0.9 % (FLUSH) 0.9 %
10 SYRINGE (ML) INJECTION AS NEEDED
Status: DISCONTINUED | OUTPATIENT
Start: 2025-05-28 | End: 2025-05-28 | Stop reason: HOSPADM

## 2025-05-28 RX ADMIN — PROPOFOL 140 MCG/KG/MIN: 10 INJECTION, EMULSION INTRAVENOUS at 09:30

## 2025-05-28 RX ADMIN — PROPOFOL 100 MG: 10 INJECTION, EMULSION INTRAVENOUS at 09:31

## 2025-05-28 RX ADMIN — LIDOCAINE HYDROCHLORIDE 50 MG: 20 INJECTION, SOLUTION INFILTRATION; PERINEURAL at 09:30

## 2025-05-28 RX ADMIN — SODIUM CHLORIDE, POTASSIUM CHLORIDE, SODIUM LACTATE AND CALCIUM CHLORIDE 30 ML/HR: 600; 310; 30; 20 INJECTION, SOLUTION INTRAVENOUS at 08:43

## 2025-05-28 NOTE — H&P
Patient Care Team:  Adrian Metcalf MD as PCP - General (Family Medicine)  Chandan Rubio DO as PCP - Family Medicine    CHIEF COMPLAINT:  C/s for sigmoid diverticulitis.     HISTORY OF PRESENT ILLNESS:  C/s for sigmoid diverticulitis.     Past Medical History:   Diagnosis Date    Asthma     exercise induced    Cholelithiasis 2011    Gallbladder removed    Colon polyp     found in colonoscopy    Diverticulitis of colon 4/12/24    with abscess    Hernia 01/05/2023    Upper gastrointestinal tract    Hyperlipidemia 02/2018    take atorvastatin 40mg    Hypertension 11/27/24    take labetalol 200mg    Sleep apnea      Past Surgical History:   Procedure Laterality Date    ABDOMINAL SURGERY      CHOLECYSTECTOMY  2011    COLONOSCOPY      ENDOMETRIAL ABLATION      HERNIA REPAIR       Family History   Problem Relation Age of Onset    Hyperlipidemia Mother     Colon polyps Father      Social History     Tobacco Use    Smoking status: Never    Smokeless tobacco: Never   Vaping Use    Vaping status: Never Used   Substance Use Topics    Alcohol use: Yes     Comment: very rare    Drug use: No     Medications Prior to Admission   Medication Sig Dispense Refill Last Dose/Taking    atorvastatin (LIPITOR) 40 MG tablet TAKE 1 TABLET BY MOUTH DAILY. 30 tablet 0 5/27/2025    cetirizine (ZyrTEC) 10 MG tablet Take 1 tablet by mouth Daily.   5/27/2025    dicyclomine (BENTYL) 10 MG capsule Take 1 capsule by mouth 3 (Three) Times a Day As Needed (abdominal pain/diarrhea). 90 capsule 5 Taking As Needed    labetalol (NORMODYNE) 200 MG tablet Take 1 tablet by mouth 2 (Two) Times a Day.   5/27/2025    LYSINE PO Take 1 tablet/day by mouth Daily.   5/27/2025    multivitamin with minerals (MULTIVITAMIN ADULT PO) Take 1 tablet by mouth Daily.   5/27/2025    Tenapanor HCl 50 MG tablet Take 1 tablet by mouth 2 (Two) Times a Day With Meals. 18 tablet 0 5/18/2025    venlafaxine XR (EFFEXOR XR) 37.5 MG 24 hr capsule Take 1 capsule by mouth  "once daily 90 capsule 3 5/27/2025    Albuterol Sulfate (PROAIR RESPICLICK IN) Inhale every 6 (six) hours as needed.   More than a month    SUMAtriptan (IMITREX) 100 MG tablet Take one tablet at onset of headache. May repeat dose one time in 2 hours if headache not relieved. 9 tablet 5 More than a month    valACYclovir (VALTREX) 1000 MG tablet Take 1 tablet by mouth As Needed.   More than a month     Allergies:  Codeine, Chlorcyclizine, Chlorcyclizine-pseudoephed, Cimetidine, and Diclofenac    REVIEW OF SYSTEMS:  Please see the above history of present illness for pertinent positives and negatives.  The remainder of the patient's systems have been reviewed and are negative.     Vital Signs  Temp:  [97.1 °F (36.2 °C)] 97.1 °F (36.2 °C)  Heart Rate:  [68] 68  Resp:  [18] 18  BP: (140)/(87) 140/87    Flowsheet Rows      Flowsheet Row First Filed Value   Admission Height 172.7 cm (68\") Documented at 05/27/2025 1136   Admission Weight 94.3 kg (208 lb) Documented at 05/27/2025 1136             Physical Exam:  Physical Exam   Constitutional: Patient appears well-developed and well-nourished and in no acute distress   HEENT:   Head: Normocephalic and atraumatic.   Eyes:  Pupils are equal, round, and reactive to light. EOM are intact. Sclerae are anicteric and non-injected.  Mouth and Throat: Patient has moist mucous membranes. Oropharynx is clear of any erythema or exudate.     Neck: Neck supple. No JVD present. No thyromegaly present. No lymphadenopathy present.  Cardiovascular: Regular rate, regular rhythm, S1 normal and S2 normal.  Exam reveals no gallop and no friction rub.  No murmur heard.  Pulmonary/Chest: Lungs are clear to auscultation bilaterally. No respiratory distress. No wheezes. No rhonchi. No rales.   Abdominal: Soft. Bowel sounds are normal. No distension and no mass. There is no hepatosplenomegaly. There is no tenderness.   Musculoskeletal: Normal Muscle tone  Extremities: No edema. Pulses are palpable in " all 4 extremities.  Neurological: Patient is alert and oriented to person, place, and time. Cranial nerves II-XII are grossly intact with no focal deficits.  Skin: Skin is warm. No rash noted. Nails show no clubbing.  No cyanosis or erythema.    Debilities/Disabilities Identified: None  Emotional Behavior: Appropriate     Results Review:   I reviewed the patient's new clinical results.    Lab Results (most recent)       None            Imaging Results (Most Recent)       None          reviewed    ECG/EMG Results (most recent)       None          reviewed    Assessment & Plan   C/s for sigmoid diverticulitis.  /  colonoscopy      I discussed the patient's findings and my recommendations with patient.     Arcadio Mauro MD  05/28/25  09:30 EDT    Time: 10 min prior to procedure.

## 2025-05-28 NOTE — ANESTHESIA PREPROCEDURE EVALUATION
Anesthesia Evaluation     Patient summary reviewed                Airway   Mallampati: II  No difficulty expected  Dental - normal exam     Pulmonary - normal exam   (+) pulmonary embolism, asthma,sleep apnea  (-) not a smoker  Cardiovascular - normal exam    (+) hypertension, DVT, hyperlipidemia    ROS comment: Reviewed echo report:    · Left ventricular wall thickness is consistent with mild concentric hypertrophy.  · Probably normal LV systolic function.  · The study is technically suboptimal for diagnosis.         Neuro/Psych  (+) numbness, psychiatric history Depression  GI/Hepatic/Renal/Endo    (+) obesity  (-) no renal disease, diabetes    Musculoskeletal     (+) neck pain  Abdominal    Substance History      OB/GYN          Other                    Anesthesia Plan    ASA 2     MAC       Anesthetic plan, risks, benefits, and alternatives have been provided, discussed and informed consent has been obtained with: patient.    CODE STATUS:

## 2025-05-28 NOTE — DISCHARGE INSTRUCTIONS
ENDOSCOPY - EGD/COLONOSCOPY  - ADULT CARE DISCHARGE  INSTRUCTIONS       Instructions for the next 24 hours after your Procedure:     Adult supervision     Do NOT drink any alcohol      Do not work today     NO important decisions     DO NOT sign any legal documents     DO NOT  DRIVE or operate machinery     Resume normal activity tomorrow             Discharge  Diet:     Avoid spicy/ greasy foods     Avoid any food that will cause more gas or bloating             Symptoms you may temporarily experience:        Bloating/Cramping     Dizziness     IV Irritation/tenderness     Gas or Belching     Slight fever     Small amount of blood in vomit or stool               Call Your Doctor for the following Problems: ______________________     ______________________     Fever of 101 degrees or higher       Sharp abdominal  pain     Red streak up the arm from the IV site     Severe cramping        Large amount of blood in stool or vomit      ***  If polyps were removed there is a risk of bleeding which is  more likely to occur 7-10 days after colonoscopy       *** Seek IMMEDIATE medical attention and call 911 if you develop symptoms such as:     Chest pain     Shortness of breath     Severe bleeding

## 2025-05-28 NOTE — ANESTHESIA POSTPROCEDURE EVALUATION
"Patient: Merari Bowen    Procedure Summary       Date: 05/28/25 Room / Location: [facility] / [facility]    Anesthesia Start: 0925 Anesthesia Stop: 0952    Procedure: COLONOSCOPY FOR SCREENING Diagnosis:       Encounter for screening colonoscopy      H/O diverticulitis of colon      Generalized abdominal pain      (Encounter for screening colonoscopy [Z12.11])      (H/O diverticulitis of colon [Z87.19])      (Generalized abdominal pain [R10.84])    Surgeons: Arcadio Mauro MD Provider: Porsche Montilla MD    Anesthesia Type: MAC ASA Status: 2            Anesthesia Type: MAC    Vitals  Vitals Value Taken Time   /78 05/28/25 10:24   Temp 36.7 °C (98 °F) 05/28/25 09:50   Pulse 68 05/28/25 10:18   Resp 16 05/28/25 10:10   SpO2 97 % 05/28/25 10:11   Vitals shown include unfiled device data.        Post Anesthesia Care and Evaluation    Patient location during evaluation: PHASE II  Patient participation: complete - patient participated  Level of consciousness: awake  Pain management: adequate    Airway patency: patent  Anesthetic complications: No anesthetic complications    Cardiovascular status: acceptable  Respiratory status: acceptable  Hydration status: acceptable    Comments: /78   Pulse 71   Temp 36.7 °C (98 °F) (Temporal)   Resp 16   Ht 172.7 cm (68\")   Wt 93.4 kg (206 lb)   SpO2 98%   BMI 31.32 kg/m²     "

## 2025-06-25 ENCOUNTER — OFFICE VISIT (OUTPATIENT)
Dept: GASTROENTEROLOGY | Facility: CLINIC | Age: 50
End: 2025-06-25
Payer: COMMERCIAL

## 2025-06-25 ENCOUNTER — LAB (OUTPATIENT)
Dept: LAB | Facility: HOSPITAL | Age: 50
End: 2025-06-25
Payer: COMMERCIAL

## 2025-06-25 VITALS
DIASTOLIC BLOOD PRESSURE: 60 MMHG | BODY MASS INDEX: 31.22 KG/M2 | WEIGHT: 206 LBS | SYSTOLIC BLOOD PRESSURE: 128 MMHG | HEIGHT: 68 IN

## 2025-06-25 DIAGNOSIS — R10.13 EPIGASTRIC PAIN: Primary | ICD-10-CM

## 2025-06-25 DIAGNOSIS — K59.04 CHRONIC IDIOPATHIC CONSTIPATION: ICD-10-CM

## 2025-06-25 PROCEDURE — 83013 H PYLORI (C-13) BREATH: CPT

## 2025-06-25 RX ORDER — PANTOPRAZOLE SODIUM 40 MG/1
40 TABLET, DELAYED RELEASE ORAL DAILY
Qty: 90 TABLET | Refills: 0 | Status: SHIPPED | OUTPATIENT
Start: 2025-06-25

## 2025-06-25 NOTE — PATIENT INSTRUCTIONS
Breath testing today for h.pylori     Ways to help reduce heartburn symptoms:    start Protonix/Pantoprazole 40 mg daily prior to breakfast x 12 weeks then stop   Can also continue as needed over the counter tums or liquid gavison for break through symptoms  Avoid eating late night snacks within 3 hours of going to bed  If you have increased abdominal body weight, lifestyle changes to improve weight can help with heartburn symtoms  If you use tobacco products, we recommend that you stop smoking including e-cigarettes  Research has proven that people with heartburn who use tobacco can reduce heartburn symptoms by 44% after they stop smoking.   Sleep on your left side  Sleeping with your head/upper body elevated with a wedge pillow or with the head of the bed inclined   Avoid foods that can trigger symptoms which may include:  citrus fruits  spicy foods,  Tomatoes and Red sauces   Chocolate  coffee/tea  caffeinated or carbonated beverages  Alcohol  High fat foods  peppermint    For Constipation:    Start Linzess 72 mcg  1 pill at least 30 minutes before first meal of the day.  Taking it with food can increase risk for diarrhea  It may take trying several different medications to find the right medication regimen to help regulate your bowel movements.    The goal for treatment with constipation is to find the best medication to promote more regular bowel movements with complete evacuation.  It can take up to 14 days to see full benefit of medication and daily use of Linzess helps to promote treatment success.        Goal of constipation regimen is to produce at least 3 complete bowel movements per week   Recommend starting a soluble fiber regimen that includes psyllium such as Metamucil.    This helps to keep stool soft and formed and easy transit throughout the colon to produce regular and complete bowel movements.  Consume at least 20 to 30 g of dietary fiber per day  Research has proven that consuming 5 prunes or 2  kiwi fruit per day can also aid in reducing constipation.  When starting fiber regimen recommend starting with a low-dose and gradually increasing by 1 tablespoon every 7 days as tolerated.    This will help your body acclimate to the higher fiber diet and reduce risk of unwanted side effects that include bloating, gas, abdominal fullness, and cramping  For example: Begin taking 1 tablespoon daily for at least 7 days, if this is not successful in producing regular bowel movements can begin taking 1 tablespoons twice a day, and finally if this is not successful after taking 2 tablespoons for 7 days, can further increase to maximum recommended dosing of 1 tablespoon 3 times per day.  If you choose capsule formulation:  Begin taking 3 capsules daily in the morning with at least 8 ounces of water, then increase by 1 capsule every 7 days until stool becomes soft and bowel movements are complete and sensation.  Do not exceed maximum package dosing  It is important to consume increased amounts of fluid throughout the day to help improve the effectiveness of the fiber supplementation  Avoid gummy formulations of fiber as this can further increase your risk of the above adverse effects due to artificial sweeteners in the Gummies.      Next colonoscopy due  5/2035

## 2025-06-25 NOTE — PROGRESS NOTES
Patient or patient representative verbalized consent for the use of Ambient Listening during the visit with  RANDOLPH Pan for chart documentation. 6/25/2025  08:53 EDT    Chief Complaint   Patient presents with    Constipation    Diarrhea    Heartburn         Patient is a 50 y.o. who presents to the office for follow-up after colonoscopy evaluation with Dr. Mauro.  Last in office visit completed on 5/13/2025 as a new patient to the practice.  Patient has a significant past medical history of  asthma,  constipation and history of complicated diverticulitis-requiring hospitalization to [facility].     5/28/2025 Colonoscopy:  Bowel prep described as good and complete to terminal ileum  Multiple medium mouth diverticula in sigmoid colon no specimens obtained  Next Colonoscopy for screening recommended at  10 -year interval due  5/28/2035    Past Surgical History   4/25/2024-percutaneous drain for treatment of perforated diverticulitis  Past Surgical History:   Procedure Laterality Date    CHOLECYSTECTOMY 2011    COLONOSCOPY N/A 5/28/2025    Procedure: COLONOSCOPY FOR SCREENING;  Surgeon: Arcadio Mauro MD;  Location: [facility] MAIN OR;  Service: Gastroenterology;  Laterality: N/A;  diverticulosis    ABDOMINAL SURGERY      COLONOSCOPY      ENDOMETRIAL ABLATION      HERNIA REPAIR         Social History:  Social History     Tobacco Use    Smoking status: Never    Smokeless tobacco: Never   Substance Use Topics    Alcohol use: Yes     Comment: very rare     Social History     Substance and Sexual Activity   Drug Use No       Family history:  Family History   Problem Relation Age of Onset    Hyperlipidemia Mother     Colon polyps Father        History of Present Illness  The patient presents for evaluation of irritable bowel syndrome (IBS), gastroesophageal reflux disease (GERD), and osteoarthritis.    Irritable Bowel Syndrome (IBS)-C  - The patient reports that Ibsrela induces severe diarrhea even when  administered once daily, necessitating intermittent use as required.  - They must remain in proximity to a bathroom when taking this medication.  - The patient continues to use fiber supplements and dicyclomine as needed for abdominal cramping.  - Denies nocturnal bowel movements.  Denies melena or hematochezia.  Despite irregularity with Ibsrela abdominal pain has resolved and has not required subsequent use of dicyclomine since last in office visit    Epigastric pain   - The patient experiences nocturnal heartburn, which is managed with calcium carbonate (Tums), providing partial relief.  - Recently, the patient had an episode of nocturnal regurgitation.  - Ibuprofen and naproxen were discontinued due to suspected gastrointestinal adverse effects.    Osteoarthritis  - The patient has osteoarthritis affecting the knees, cervical spine, and other joints.  - They recently received an epidural steroid injection in the cervical spine for pain management.  - The patient has initiated Tylenol Arthritis (acetaminophen) for analgesia.      Current/Previous treatment for epigastric pain  Current:  Tums as needed    Current/Previous treatment for IBS-C  Current:  Ibsrela 50 mg twice daily-initiated 5/13/2025  Fiber  Dicyclomine 10 mg 3 times daily  Previous:  Dicyclomine 20 mg 4 times daily initiated after ER evaluation on 4/16/2025      Medications    Current Outpatient Medications:     Albuterol Sulfate (PROAIR RESPICLICK IN), Inhale every 6 (six) hours as needed., Disp: , Rfl:     atorvastatin (LIPITOR) 40 MG tablet, TAKE 1 TABLET BY MOUTH DAILY., Disp: 30 tablet, Rfl: 0    cetirizine (ZyrTEC) 10 MG tablet, Take 1 tablet by mouth Daily., Disp: , Rfl:     dicyclomine (BENTYL) 10 MG capsule, Take 1 capsule by mouth 3 (Three) Times a Day As Needed (abdominal pain/diarrhea)., Disp: 90 capsule, Rfl: 5    labetalol (NORMODYNE) 200 MG tablet, Take 1 tablet by mouth 2 (Two) Times a Day., Disp: , Rfl:     LYSINE PO, Take 1  "tablet/day by mouth Daily., Disp: , Rfl:     multivitamin with minerals (MULTIVITAMIN ADULT PO), Take 1 tablet by mouth Daily., Disp: , Rfl:     SUMAtriptan (IMITREX) 100 MG tablet, Take one tablet at onset of headache. May repeat dose one time in 2 hours if headache not relieved., Disp: 9 tablet, Rfl: 5    valACYclovir (VALTREX) 1000 MG tablet, Take 1 tablet by mouth As Needed., Disp: , Rfl:     venlafaxine XR (EFFEXOR XR) 37.5 MG 24 hr capsule, Take 1 capsule by mouth once daily, Disp: 90 capsule, Rfl: 3    linaclotide (Linzess) 72 MCG capsule capsule, Take 1 capsule by mouth Every Morning Before Breakfast., Disp: 12 capsule, Rfl: 0    pantoprazole (PROTONIX) 40 MG EC tablet, Take 1 tablet by mouth Daily., Disp: 90 tablet, Rfl: 0  Result Review :      Colonoscopy (05/28/2025 09:23)   Office Visit with Samia Singer APRN (05/13/2025)   US Gallbladder (01/05/2019 18:28)  CT Abdomen Pelvis With & Without Contrast (01/05/2019 20:17)  Duplex Portal Hepatic Complete CAR (01/06/2019 11:09)  CT Abdomen Pelvis With Contrast (04/16/2025 14:04)   Sigmoid diverticulosis without active diverticulitis  Hiatal hernia  Vital Signs:   /60 (BP Location: Right arm, Patient Position: Sitting, Cuff Size: Adult)   Ht 172.7 cm (67.99\")   Wt 93.4 kg (206 lb)   BMI 31.33 kg/m²     Body mass index is 31.33 kg/m².      Physical Exam  Constitutional:       General: She is not in acute distress.     Appearance: Normal appearance. She is not ill-appearing.   HENT:      Head: Normocephalic.   Eyes:      General: No scleral icterus.  Pulmonary:      Effort: No respiratory distress.   Abdominal:      General: Abdomen is flat. Bowel sounds are normal. There is no distension.      Palpations: Abdomen is soft. There is no mass.      Tenderness: There is no abdominal tenderness. There is no guarding or rebound.      Hernia: No hernia is present.   Skin:     General: Skin is warm and dry.   Neurological:      Mental Status: She is alert. "      Gait: Gait normal.   Psychiatric:         Mood and Affect: Mood normal.       Assessment and Plan    Diagnoses and all orders for this visit:    1. Epigastric pain (Primary)  -     H. Pylori Breath Test - Breath, Lung  -     pantoprazole (PROTONIX) 40 MG EC tablet; Take 1 tablet by mouth Daily.  Dispense: 90 tablet; Refill: 0    2. Chronic idiopathic constipation  -     linaclotide (Linzess) 72 MCG capsule capsule; Take 1 capsule by mouth Every Morning Before Breakfast.  Dispense: 12 capsule; Refill: 0       Assessment & Plan  IBS:  - Colonoscopy satisfactory; no active inflammation or irritation  - Next colonoscopy in 10 years  -Unfortunately patient experienced intolerable diarrhea with regular Ibsrela dosing followed by unpredictable bowel movements with associated urgency with intermittent dosing.  - Initiate Linzess 72 mcg daily, 30 minutes before the first meal  - Continue fiber intake  - If Linzess too potent, update in 2 weeks; consider MiraLAX 1 capful daily with fiber as a less aggressive alternative    Heartburn:  - Possible small or hiatal hernia, especially nocturnal onset of heartburn.  I suspect a component of NSAID induced in am hopeful that avoidance of NSAIDs and 12-week trial of PPI will help to improve concerns.  - Initiate 12-week pantoprazole course before breakfast  - Avoid eating within 2 hours of bedtime  - Continue Tums or Gaviscon as needed  - Breath test for H. pylori today  - If H. pylori positive, initiate treatment  - If symptoms persist after 12 weeks, consider further diagnostics    Arthritis:  - Arthritis in knees, neck, and joints  - Received epidural injection in neck for pain  - Started Tylenol Arthritis for pain  - Consider Celebrex as an alternative to ibuprofen for lower gastrointestinal risk    Follow-up:  - 12 weeks    Patient is agreeable to the outlined above treatment plan.  Verbalizes understanding and will contact office for any new or worsening concerns.  All  questions answered and support provided.  Patient Instructions   Breath testing today for h.pylori     Ways to help reduce heartburn symptoms:    start Protonix/Pantoprazole 40 mg daily prior to breakfast x 12 weeks then stop   Can also continue as needed over the counter tums or liquid gavison for break through symptoms  Avoid eating late night snacks within 3 hours of going to bed  If you have increased abdominal body weight, lifestyle changes to improve weight can help with heartburn symtoms  If you use tobacco products, we recommend that you stop smoking including e-cigarettes  Research has proven that people with heartburn who use tobacco can reduce heartburn symptoms by 44% after they stop smoking.   Sleep on your left side  Sleeping with your head/upper body elevated with a wedge pillow or with the head of the bed inclined   Avoid foods that can trigger symptoms which may include:  citrus fruits  spicy foods,  Tomatoes and Red sauces   Chocolate  coffee/tea  caffeinated or carbonated beverages  Alcohol  High fat foods  peppermint    For Constipation:    Start Linzess 72 mcg  1 pill at least 30 minutes before first meal of the day.  Taking it with food can increase risk for diarrhea  It may take trying several different medications to find the right medication regimen to help regulate your bowel movements.    The goal for treatment with constipation is to find the best medication to promote more regular bowel movements with complete evacuation.  It can take up to 14 days to see full benefit of medication and daily use of Linzess helps to promote treatment success.        Goal of constipation regimen is to produce at least 3 complete bowel movements per week   Recommend starting a soluble fiber regimen that includes psyllium such as Metamucil.    This helps to keep stool soft and formed and easy transit throughout the colon to produce regular and complete bowel movements.  Consume at least 20 to 30 g of dietary  fiber per day  Research has proven that consuming 5 prunes or 2 kiwi fruit per day can also aid in reducing constipation.  When starting fiber regimen recommend starting with a low-dose and gradually increasing by 1 tablespoon every 7 days as tolerated.    This will help your body acclimate to the higher fiber diet and reduce risk of unwanted side effects that include bloating, gas, abdominal fullness, and cramping  For example: Begin taking 1 tablespoon daily for at least 7 days, if this is not successful in producing regular bowel movements can begin taking 1 tablespoons twice a day, and finally if this is not successful after taking 2 tablespoons for 7 days, can further increase to maximum recommended dosing of 1 tablespoon 3 times per day.  If you choose capsule formulation:  Begin taking 3 capsules daily in the morning with at least 8 ounces of water, then increase by 1 capsule every 7 days until stool becomes soft and bowel movements are complete and sensation.  Do not exceed maximum package dosing  It is important to consume increased amounts of fluid throughout the day to help improve the effectiveness of the fiber supplementation  Avoid gummy formulations of fiber as this can further increase your risk of the above adverse effects due to artificial sweeteners in the Gummies.      Next colonoscopy due  5/2035      EMR Dragon/Transcription Disclaimer:  This document has been Dictated utilizing Dragon dictation.     Samia Singer, MSN, APRN, FNP-C   [facility]  Gastroenterology   [address] [facility]  [place] [place], [place] [id number] [phone] office  [phone] fax

## 2025-06-26 LAB — UREA BREATH TEST QL: NEGATIVE

## (undated) DEVICE — KT ORCA ORCAPOD DISP STRL

## (undated) DEVICE — Device

## (undated) DEVICE — FLEX ADVANTAGE 1500CC: Brand: FLEX ADVANTAGE

## (undated) DEVICE — GOWN ISOL W/THUMB UNIV BLU BX/15

## (undated) DEVICE — CANN O2 ETCO2 FITS ALL CONN CO2 SMPL A/ 7IN DISP LF

## (undated) DEVICE — GOWN SURG ENDOARMOR LVL3 UNIV KNT/CUF DISP NS

## (undated) DEVICE — ADAPT CLN SCPE ENDO PORPOISE BX/50 DISP